# Patient Record
Sex: FEMALE | Race: OTHER | Employment: PART TIME | URBAN - METROPOLITAN AREA
[De-identification: names, ages, dates, MRNs, and addresses within clinical notes are randomized per-mention and may not be internally consistent; named-entity substitution may affect disease eponyms.]

---

## 2020-06-18 PROBLEM — Z82.79 FAMILY HISTORY OF DOWN SYNDROME: Status: ACTIVE | Noted: 2020-06-18

## 2020-06-18 PROBLEM — Z34.90 PREGNANCY: Status: ACTIVE | Noted: 2020-06-18

## 2020-08-03 ENCOUNTER — HOSPITAL ENCOUNTER (EMERGENCY)
Age: 30
Discharge: HOME | End: 2020-08-03
Attending: EMERGENCY MEDICINE
Payer: MEDICAID

## 2020-08-03 VITALS
OXYGEN SATURATION: 100 % | BODY MASS INDEX: 24.48 KG/M2 | DIASTOLIC BLOOD PRESSURE: 85 MMHG | HEIGHT: 62 IN | WEIGHT: 133 LBS | RESPIRATION RATE: 18 BRPM | HEART RATE: 97 BPM | TEMPERATURE: 97.8 F | SYSTOLIC BLOOD PRESSURE: 131 MMHG

## 2020-08-03 DIAGNOSIS — O21.0 HYPEREMESIS GRAVIDARUM: Primary | ICD-10-CM

## 2020-08-03 LAB
ALBUMIN SERPL-MCNC: 3.1 G/DL (ref 3.5–5)
ALBUMIN/GLOB SERPL: 0.7 {RATIO} (ref 1.2–3.5)
ALP SERPL-CCNC: 46 U/L (ref 50–136)
ALT SERPL-CCNC: 26 U/L (ref 12–65)
ANION GAP SERPL CALC-SCNC: 8 MMOL/L (ref 7–16)
AST SERPL-CCNC: 16 U/L (ref 15–37)
BASOPHILS # BLD: 0.1 K/UL (ref 0–0.2)
BASOPHILS NFR BLD: 1 % (ref 0–2)
BILIRUB SERPL-MCNC: 0.4 MG/DL (ref 0.2–1.1)
BUN SERPL-MCNC: 7 MG/DL (ref 6–23)
CALCIUM SERPL-MCNC: 8.7 MG/DL (ref 8.3–10.4)
CHLORIDE SERPL-SCNC: 106 MMOL/L (ref 98–107)
CO2 SERPL-SCNC: 24 MMOL/L (ref 21–32)
CREAT SERPL-MCNC: 0.42 MG/DL (ref 0.6–1)
DIFFERENTIAL METHOD BLD: ABNORMAL
EOSINOPHIL # BLD: 0 K/UL (ref 0–0.8)
EOSINOPHIL NFR BLD: 0 % (ref 0.5–7.8)
ERYTHROCYTE [DISTWIDTH] IN BLOOD BY AUTOMATED COUNT: 12.7 % (ref 11.9–14.6)
GLOBULIN SER CALC-MCNC: 4.3 G/DL (ref 2.3–3.5)
GLUCOSE SERPL-MCNC: 100 MG/DL (ref 65–100)
HCT VFR BLD AUTO: 35.2 % (ref 35.8–46.3)
HGB BLD-MCNC: 12.4 G/DL (ref 11.7–15.4)
IMM GRANULOCYTES # BLD AUTO: 0.1 K/UL (ref 0–0.5)
IMM GRANULOCYTES NFR BLD AUTO: 1 % (ref 0–5)
LYMPHOCYTES # BLD: 2.2 K/UL (ref 0.5–4.6)
LYMPHOCYTES NFR BLD: 27 % (ref 13–44)
MAGNESIUM SERPL-MCNC: 2 MG/DL (ref 1.8–2.4)
MCH RBC QN AUTO: 31.6 PG (ref 26.1–32.9)
MCHC RBC AUTO-ENTMCNC: 35.2 G/DL (ref 31.4–35)
MCV RBC AUTO: 89.8 FL (ref 79.6–97.8)
MONOCYTES # BLD: 0.8 K/UL (ref 0.1–1.3)
MONOCYTES NFR BLD: 10 % (ref 4–12)
NEUTS SEG # BLD: 5.2 K/UL (ref 1.7–8.2)
NEUTS SEG NFR BLD: 62 % (ref 43–78)
NRBC # BLD: 0 K/UL (ref 0–0.2)
PLATELET # BLD AUTO: 233 K/UL (ref 150–450)
PMV BLD AUTO: 11.3 FL (ref 9.4–12.3)
POTASSIUM SERPL-SCNC: 3.1 MMOL/L (ref 3.5–5.1)
PROT SERPL-MCNC: 7.4 G/DL (ref 6.3–8.2)
RBC # BLD AUTO: 3.92 M/UL (ref 4.05–5.2)
SODIUM SERPL-SCNC: 138 MMOL/L (ref 136–145)
WBC # BLD AUTO: 8.3 K/UL (ref 4.3–11.1)

## 2020-08-03 PROCEDURE — 99282 EMERGENCY DEPT VISIT SF MDM: CPT

## 2020-08-03 PROCEDURE — 80053 COMPREHEN METABOLIC PANEL: CPT

## 2020-08-03 PROCEDURE — 83735 ASSAY OF MAGNESIUM: CPT

## 2020-08-03 PROCEDURE — 74011000250 HC RX REV CODE- 250: Performed by: EMERGENCY MEDICINE

## 2020-08-03 PROCEDURE — 96361 HYDRATE IV INFUSION ADD-ON: CPT

## 2020-08-03 PROCEDURE — 96374 THER/PROPH/DIAG INJ IV PUSH: CPT

## 2020-08-03 PROCEDURE — 85025 COMPLETE CBC W/AUTO DIFF WBC: CPT

## 2020-08-03 PROCEDURE — 74011250636 HC RX REV CODE- 250/636: Performed by: EMERGENCY MEDICINE

## 2020-08-03 RX ORDER — SODIUM CHLORIDE 0.9 % (FLUSH) 0.9 %
5-40 SYRINGE (ML) INJECTION EVERY 8 HOURS
Status: DISCONTINUED | OUTPATIENT
Start: 2020-08-03 | End: 2020-08-03 | Stop reason: HOSPADM

## 2020-08-03 RX ORDER — SODIUM CHLORIDE 0.9 % (FLUSH) 0.9 %
5-40 SYRINGE (ML) INJECTION AS NEEDED
Status: DISCONTINUED | OUTPATIENT
Start: 2020-08-03 | End: 2020-08-03 | Stop reason: HOSPADM

## 2020-08-03 RX ADMIN — SODIUM CHLORIDE 12.5 MG: 9 INJECTION INTRAMUSCULAR; INTRAVENOUS; SUBCUTANEOUS at 14:04

## 2020-08-03 RX ADMIN — SODIUM CHLORIDE, SODIUM LACTATE, POTASSIUM CHLORIDE, AND CALCIUM CHLORIDE 1000 ML: 600; 310; 30; 20 INJECTION, SOLUTION INTRAVENOUS at 13:52

## 2020-08-03 NOTE — PROGRESS NOTES
Visited with patient as no PCP listed in chart. Demographics on face sheet verified. Patient states no PCP. I have sent a e-mail to [facility] to aid in finding pt a new PCP. Verified that pt has the capability to have a virtual visit with there new provider via smart phone or computer.

## 2020-08-03 NOTE — ED NOTES
I have reviewed discharge instructions with the patient. The patient verbalized understanding. Patient left ED via Discharge Method: ambulatory to Home with self. Opportunity for questions and clarification provided. Patient given 0 scripts. To continue your aftercare when you leave the hospital, you may receive an automated call from our care team to check in on how you are doing. This is a free service and part of our promise to provide the best care and service to meet your aftercare needs.  If you have questions, or wish to unsubscribe from this service please call [phone]. Thank you for Choosing our [facility] Emergency Department.

## 2020-08-03 NOTE — ED PROVIDER NOTES
Patient was referred to the emergency department by her OB/GYN, Dr. Andrea South for IV hydration secondary to hyperemesis gravidarum. The patient is approximately 16 weeks estimated gestational age of  pregnancy. Patient also had hyperemesis with her first pregnancy. She denies any fever or chills or diarrhea and denies any hematemesis. She also denies any abdominal pain. She has been using Zofran and Phenergan alternately at home with no significant improvement. The history is provided by the patient and medical records. Pregnancy Problem    This is a recurrent problem. The current episode started more than 1 week ago. The problem occurs constantly. The problem has not changed since onset. The patient is experiencing no pain. Associated symptoms include nausea and vomiting. Pertinent negatives include no anorexia, no fever, no belching, no diarrhea, no flatus, no hematochezia, no melena, no constipation, no dysuria, no frequency, no hematuria, no arthralgias, no myalgias, no trauma, no chest pain and no back pain. Nothing worsens the pain. The pain is relieved by nothing. The patient's surgical history non-contributory.        Past Medical History:   Diagnosis Date    Anemia     Miscarriage        Past Surgical History:   Procedure Laterality Date    HX DILATION AND EVACUATION           Family History:   Problem Relation Age of Onset    Hypertension Mother        Social History     Socioeconomic History    Marital status:      Spouse name: Not on file    Number of children: Not on file    Years of education: Not on file    Highest education level: Not on file   Occupational History    Not on file   Social Needs    Financial resource strain: Not on file    Food insecurity     Worry: Not on file     Inability: Not on file    Transportation needs     Medical: Not on file     Non-medical: Not on file   Tobacco Use    Smoking status: Never Smoker    Smokeless tobacco: Never Used   Substance and Sexual Activity    Alcohol use: Not Currently    Drug use: Not Currently    Sexual activity: Yes     Partners: Male     Birth control/protection: None   Lifestyle    Physical activity     Days per week: Not on file     Minutes per session: Not on file    Stress: Not on file   Relationships    Social connections     Talks on phone: Not on file     Gets together: Not on file     Attends [employer] service: Not on file     Active member of club or organization: Not on file     Attends meetings of clubs or organizations: Not on file     Relationship status: Not on file    Intimate partner violence     Fear of current or ex partner: Not on file     Emotionally abused: Not on file     Physically abused: Not on file     Forced sexual activity: Not on file   Other Topics Concern    Not on file   Social History Narrative    Not on file         ALLERGIES: Patient has no known allergies. Review of Systems   Constitutional: Negative for fever. Cardiovascular: Negative for chest pain. Gastrointestinal: Positive for nausea and vomiting. Negative for anorexia, constipation, diarrhea, flatus, hematochezia and melena. Genitourinary: Negative for dysuria, frequency and hematuria. Musculoskeletal: Negative for arthralgias, back pain and myalgias. All other systems reviewed and are negative. Vitals:    08/03/20 1237   BP: 131/85   Pulse: 97   Resp: 18   Temp: 97.8 °F (36.6 °C)   SpO2: 99%   Weight: 60.3 kg (133 lb)   Height: 5' 2\" (1.575 m)            Physical Exam  Vitals signs and nursing note reviewed. Constitutional:       General: She is not in acute distress. Appearance: Normal appearance. She is not ill-appearing, toxic-appearing or diaphoretic. HENT:      Head: Normocephalic and atraumatic. Mouth/Throat:      Mouth: Mucous membranes are moist.      Pharynx: Oropharynx is clear. No oropharyngeal exudate or posterior oropharyngeal erythema.    Eyes:      Extraocular Movements: Extraocular movements intact. Conjunctiva/sclera: Conjunctivae normal.      Pupils: Pupils are equal, round, and reactive to light. Neck:      Musculoskeletal: Normal range of motion and neck supple. Cardiovascular:      Rate and Rhythm: Normal rate and regular rhythm. Pulses: Normal pulses. Heart sounds: Normal heart sounds. Pulmonary:      Effort: Pulmonary effort is normal.      Breath sounds: Normal breath sounds. Abdominal:      General: Bowel sounds are normal. There is no distension. Palpations: Abdomen is soft. Tenderness: There is no abdominal tenderness. There is no right CVA tenderness, left CVA tenderness, guarding or rebound. Musculoskeletal: Normal range of motion. Skin:     General: Skin is warm and dry. Capillary Refill: Capillary refill takes less than 2 seconds. Neurological:      General: No focal deficit present. Mental Status: She is alert and oriented to person, place, and time. Mental status is at baseline. Psychiatric:         Mood and Affect: Mood normal.         Behavior: Behavior normal.         Thought Content:  Thought content normal.          MDM  Number of Diagnoses or Management Options     Amount and/or Complexity of Data Reviewed  Clinical lab tests: ordered and reviewed  Review and summarize past medical records: yes  Independent visualization of images, tracings, or specimens: yes    Risk of Complications, Morbidity, and/or Mortality  Presenting problems: moderate  Diagnostic procedures: moderate  Management options: moderate    Patient Progress  Patient progress: stable         Procedures

## 2020-08-03 NOTE — DISCHARGE INSTRUCTIONS
Patient Education        Extreme Nausea and Vomiting in Pregnancy: Care Instructions  Your Care Instructions     Nausea and vomiting (often called morning sickness) are common in pregnancy. They are caused by pregnancy hormones and happen most often in the first 3 months. Some women get very sick and are not able to keep down food and fluids. This extreme morning sickness is called hyperemesis gravidarum. It can lead to a dangerous loss of fluids in the body. It also can keep you from gaining weight and getting proper nutrition during your pregnancy. Your body fluids are put back in balance with water and minerals called electrolytes. Medicine may help if you have severe nausea and vomiting. Follow-up care is a key part of your treatment and safety. Be sure to make and go to all appointments, and call your doctor if you are having problems. It's also a good idea to know your test results and keep a list of the medicines you take. How can you care for yourself at home? · Take your medicines exactly as prescribed. Call your doctor if you think you are having a problem with your medicine. · Drink plenty of fluids to prevent dehydration. Choose water and other caffeine-free clear liquids until you feel better. Try sipping on sports drinks that have salt and sugar in them. · Eat a small snack, such as crackers, before you get out of bed. Wait a few minutes, then get out of bed slowly. · Keep food in your stomach, but not too much at once. An empty stomach can make nausea worse. Eat several small meals every day instead of three large meals. · Eat more protein and less fat. · Get plenty of vitamin B6 by eating whole grains, nuts, seeds, and legumes. You can take vitamin B6 tablets if your doctor says it is okay. · Try to avoid smells and foods that make you feel sick to your stomach. · Get lots of rest.  · You may want to try acupressure bands.  They put pressure on an acupressure point in the wrist. Some women feel better using the bands. · Martha may also help you feel better. You can use it in tea, take it as a pill, or use a martha syrup that you can buy at a health food store. When should you call for help? [id number] anytime you think you may need emergency care. For example, call if:  · You passed out (lost consciousness). Call your doctor now or seek immediate medical care if:  · You are sick to your stomach or cannot drink fluids. · You have symptoms of dehydration, such as:  ? Dry eyes and a dry mouth. ? Passing only a little urine. ? Feeling thirstier than usual.  · You are not able to keep down your medicines. · You have pain in your belly or pelvis. Watch closely for changes in your health, and be sure to contact your doctor if:  · You do not get better as expected. Where can you learn more? Go to http://mercedes-aaliyah.info/  Enter D203 in the search box to learn more about \"Extreme Nausea and Vomiting in Pregnancy: Care Instructions. \"  Current as of: February 11, 2020               Content Version: 12.5  © 7348-3554 Healthwise, Incorporated. Care instructions adapted under license by [employer] (which disclaims liability or warranty for this information). If you have questions about a medical condition or this instruction, always ask your healthcare professional. [address] any warranty or liability for your use of this information.

## 2020-08-15 ENCOUNTER — HOSPITAL ENCOUNTER (OUTPATIENT)
Age: 30
Discharge: HOME | End: 2020-08-15
Attending: OBSTETRICS & GYNECOLOGY | Admitting: OBSTETRICS & GYNECOLOGY
Payer: MEDICAID

## 2020-08-15 VITALS
HEIGHT: 62 IN | RESPIRATION RATE: 16 BRPM | WEIGHT: 132 LBS | TEMPERATURE: 98.5 F | OXYGEN SATURATION: 98 % | SYSTOLIC BLOOD PRESSURE: 144 MMHG | HEART RATE: 90 BPM | BODY MASS INDEX: 24.29 KG/M2 | DIASTOLIC BLOOD PRESSURE: 83 MMHG

## 2020-08-15 PROBLEM — O21.1 HYPEREMESIS GRAVIDARUM WITH DEHYDRATION: Status: ACTIVE | Noted: 2020-08-15

## 2020-08-15 LAB
ALBUMIN SERPL-MCNC: 2.8 G/DL (ref 3.5–5)
ALBUMIN/GLOB SERPL: 0.7 {RATIO} (ref 1.2–3.5)
ALP SERPL-CCNC: 44 U/L (ref 50–130)
ALT SERPL-CCNC: 19 U/L (ref 12–65)
ANION GAP SERPL CALC-SCNC: 4 MMOL/L (ref 7–16)
AST SERPL-CCNC: 13 U/L (ref 15–37)
BASOPHILS # BLD: 0 K/UL (ref 0–0.2)
BASOPHILS NFR BLD: 0 % (ref 0–2)
BILIRUB SERPL-MCNC: 0.2 MG/DL (ref 0.2–1.1)
BUN SERPL-MCNC: 5 MG/DL (ref 6–23)
CALCIUM SERPL-MCNC: 8.7 MG/DL (ref 8.3–10.4)
CHLORIDE SERPL-SCNC: 105 MMOL/L (ref 98–107)
CO2 SERPL-SCNC: 28 MMOL/L (ref 21–32)
CREAT SERPL-MCNC: 0.49 MG/DL (ref 0.6–1)
DIFFERENTIAL METHOD BLD: ABNORMAL
EOSINOPHIL # BLD: 0 K/UL (ref 0–0.8)
EOSINOPHIL NFR BLD: 0 % (ref 0.5–7.8)
ERYTHROCYTE [DISTWIDTH] IN BLOOD BY AUTOMATED COUNT: 13.1 % (ref 11.9–14.6)
GLOBULIN SER CALC-MCNC: 3.8 G/DL (ref 2.3–3.5)
GLUCOSE SERPL-MCNC: 141 MG/DL (ref 65–100)
GLUCOSE, GLUUPC: NEGATIVE
HCT VFR BLD AUTO: 32.9 % (ref 35.8–46.3)
HGB BLD-MCNC: 11.2 G/DL (ref 11.7–15.4)
IMM GRANULOCYTES # BLD AUTO: 0.1 K/UL (ref 0–0.5)
IMM GRANULOCYTES NFR BLD AUTO: 1 % (ref 0–5)
KETONES UR-MCNC: NORMAL MG/DL
LYMPHOCYTES # BLD: 2.4 K/UL (ref 0.5–4.6)
LYMPHOCYTES NFR BLD: 27 % (ref 13–44)
MAGNESIUM SERPL-MCNC: 1.8 MG/DL (ref 1.8–2.4)
MCH RBC QN AUTO: 31.2 PG (ref 26.1–32.9)
MCHC RBC AUTO-ENTMCNC: 34 G/DL (ref 31.4–35)
MCV RBC AUTO: 91.6 FL (ref 79.6–97.8)
MONOCYTES # BLD: 0.6 K/UL (ref 0.1–1.3)
MONOCYTES NFR BLD: 7 % (ref 4–12)
NEUTS SEG # BLD: 5.6 K/UL (ref 1.7–8.2)
NEUTS SEG NFR BLD: 64 % (ref 43–78)
NRBC # BLD: 0 K/UL (ref 0–0.2)
PLATELET # BLD AUTO: 216 K/UL (ref 150–450)
PMV BLD AUTO: 11.3 FL (ref 9.4–12.3)
POTASSIUM SERPL-SCNC: 3.7 MMOL/L (ref 3.5–5.1)
PROT SERPL-MCNC: 6.6 G/DL (ref 6.3–8.2)
PROT UR QL: NORMAL
RBC # BLD AUTO: 3.59 M/UL (ref 4.05–5.2)
SODIUM SERPL-SCNC: 137 MMOL/L (ref 136–145)
WBC # BLD AUTO: 8.7 K/UL (ref 4.3–11.1)

## 2020-08-15 PROCEDURE — 74011000250 HC RX REV CODE- 250: Performed by: OBSTETRICS & GYNECOLOGY

## 2020-08-15 PROCEDURE — 96360 HYDRATION IV INFUSION INIT: CPT

## 2020-08-15 PROCEDURE — 83735 ASSAY OF MAGNESIUM: CPT

## 2020-08-15 PROCEDURE — 96361 HYDRATE IV INFUSION ADD-ON: CPT

## 2020-08-15 PROCEDURE — 80053 COMPREHEN METABOLIC PANEL: CPT

## 2020-08-15 PROCEDURE — 96374 THER/PROPH/DIAG INJ IV PUSH: CPT

## 2020-08-15 PROCEDURE — 81002 URINALYSIS NONAUTO W/O SCOPE: CPT | Performed by: OBSTETRICS & GYNECOLOGY

## 2020-08-15 PROCEDURE — 76815 OB US LIMITED FETUS(S): CPT

## 2020-08-15 PROCEDURE — 74011250636 HC RX REV CODE- 250/636: Performed by: OBSTETRICS & GYNECOLOGY

## 2020-08-15 PROCEDURE — 75810000275 HC EMERGENCY DEPT VISIT NO LEVEL OF CARE

## 2020-08-15 PROCEDURE — 85025 COMPLETE CBC W/AUTO DIFF WBC: CPT

## 2020-08-15 PROCEDURE — 99284 EMERGENCY DEPT VISIT MOD MDM: CPT

## 2020-08-15 PROCEDURE — 96375 TX/PRO/DX INJ NEW DRUG ADDON: CPT | Performed by: OBSTETRICS & GYNECOLOGY

## 2020-08-15 RX ORDER — SODIUM CHLORIDE, SODIUM LACTATE, POTASSIUM CHLORIDE, CALCIUM CHLORIDE 600; 310; 30; 20 MG/100ML; MG/100ML; MG/100ML; MG/100ML
999 INJECTION, SOLUTION INTRAVENOUS CONTINUOUS
Status: DISPENSED | OUTPATIENT
Start: 2020-08-15 | End: 2020-08-15

## 2020-08-15 RX ORDER — DEXTROSE, SODIUM CHLORIDE, SODIUM LACTATE, POTASSIUM CHLORIDE, AND CALCIUM CHLORIDE 5; .6; .31; .03; .02 G/100ML; G/100ML; G/100ML; G/100ML; G/100ML
999 INJECTION, SOLUTION INTRAVENOUS CONTINUOUS
Status: DISCONTINUED | OUTPATIENT
Start: 2020-08-15 | End: 2020-08-15 | Stop reason: HOSPADM

## 2020-08-15 RX ORDER — DEXTROSE MONOHYDRATE, SODIUM CHLORIDE, SODIUM LACTATE, POTASSIUM CHLORIDE, CALCIUM CHLORIDE 5; 600; 310; 179; 20 G/100ML; MG/100ML; MG/100ML; MG/100ML; MG/100ML
INJECTION, SOLUTION INTRAVENOUS CONTINUOUS
Status: DISCONTINUED | OUTPATIENT
Start: 2020-08-15 | End: 2020-08-15

## 2020-08-15 RX ORDER — FAMOTIDINE 20 MG/1
20 TABLET, FILM COATED ORAL
Qty: 90 TAB | Refills: 1 | Status: SHIPPED | OUTPATIENT
Start: 2020-08-15 | End: 2020-10-26

## 2020-08-15 RX ORDER — DOXYLAMINE SUCCINATE AND PYRIDOXINE HYDROCHLORIDE, DELAYED RELEASE TABLETS 10 MG/10 MG 10; 10 MG/1; MG/1
2 TABLET, DELAYED RELEASE ORAL
Qty: 60 TAB | Refills: 2 | Status: SHIPPED | OUTPATIENT
Start: 2020-08-15 | End: 2020-10-26

## 2020-08-15 RX ADMIN — SODIUM CHLORIDE 12.5 MG: 9 INJECTION INTRAMUSCULAR; INTRAVENOUS; SUBCUTANEOUS at 17:46

## 2020-08-15 RX ADMIN — SODIUM CHLORIDE, SODIUM LACTATE, POTASSIUM CHLORIDE, AND CALCIUM CHLORIDE 999 ML/HR: 600; 310; 30; 20 INJECTION, SOLUTION INTRAVENOUS at 17:46

## 2020-08-15 RX ADMIN — SODIUM CHLORIDE, SODIUM LACTATE, POTASSIUM CHLORIDE, AND CALCIUM CHLORIDE 999 ML/HR: 600; 310; 30; 20 INJECTION, SOLUTION INTRAVENOUS at 18:44

## 2020-08-15 NOTE — H&P
Chief Complaint: hyperemesis      31 y.o. female  at 17w5d  weeks gestation who is seen for hyperemesis. Pt has had vomiting throughout this pregnancy with some days worse than others. She had similar symptoms with last pregnancy. She reports that has thrown up around 15 times today. She has zofran, but that causes nausea. No abd pain. No vag bleeding or discharge        HISTORY:    Social History     Substance and Sexual Activity   Sexual Activity Yes    Partners: Male    Birth control/protection: None     Patient's last menstrual period was 2020.     Social History     Socioeconomic History    Marital status:      Spouse name: Not on file    Number of children: Not on file    Years of education: Not on file    Highest education level: Not on file   Occupational History    Not on file   Social Needs    Financial resource strain: Not on file    Food insecurity     Worry: Not on file     Inability: Not on file    Transportation needs     Medical: Not on file     Non-medical: Not on file   Tobacco Use    Smoking status: Never Smoker    Smokeless tobacco: Never Used   Substance and Sexual Activity    Alcohol use: Not Currently    Drug use: Not Currently    Sexual activity: Yes     Partners: Male     Birth control/protection: None   Lifestyle    Physical activity     Days per week: Not on file     Minutes per session: Not on file    Stress: Not on file   Relationships    Social connections     Talks on phone: Not on file     Gets together: Not on file     Attends Yarsani service: Not on file     Active member of club or organization: Not on file     Attends meetings of clubs or organizations: Not on file     Relationship status: Not on file    Intimate partner violence     Fear of current or ex partner: Not on file     Emotionally abused: Not on file     Physically abused: Not on file     Forced sexual activity: Not on file   Other Topics Concern   [address] Service Not Asked    Blood Transfusions Not Asked    Caffeine Concern Not Asked    Occupational Exposure Not Asked    Hobby Hazards Not Asked    Sleep Concern Not Asked    Stress Concern Not Asked    Weight Concern Not Asked    Special Diet Not Asked    Back Care Not Asked    Exercise Not Asked    Bike Helmet Not Asked   [address] Not Asked    Self-Exams Not Asked   Social History Narrative    Not on file       Past Surgical History:   Procedure Laterality Date    HX DILATION AND EVACUATION         Past Medical History:   Diagnosis Date    Anemia     Miscarriage          ROS:  A 12 point review of symptoms negative except for chief complaint as described above. PHYSICAL EXAM:  Blood pressure 144/83, pulse 90, temperature 98.5 °F (36.9 °C), resp. rate 16, height 5' 2\" (1.575 m), weight 59.9 kg (132 lb), last menstrual period 04/08/2020, SpO2 98 %. Constitutional: The patient appears well, alert, oriented x 3. Cardiovascular: Heart RRR, no murmurs.    Respiratory: Lungs clear, no respiratory distress  GI: Abdomen soft, nontender, no guarding  No fundal tenderness  Musculoskeletal: no cva tenderness  Upper ext: no edema, reflexes +2  Lower ext: no edema, neg soy's, reflexes +2  Skin: no rashes or lesions  Psychiatric:Mood/ Affect: appropriate  Genitourinary: SVE:deferred  FHT:+  Brief bedside ultrasound - lots of fetal movement, ant placenta grade 1, subjectively normal heather  Recent Results (from the past 12 hour(s))   POC URINE DIPSTICK MANUAL    Collection Time: 08/15/20  5:36 PM   Result Value Ref Range    Protein (POC) Trace Negative    Glucose, urine (POC) Negative Negative    Ketones (POC) 15 mg/dL Negative   CBC WITH AUTOMATED DIFF    Collection Time: 08/15/20  5:42 PM   Result Value Ref Range    WBC 8.7 4.3 - 11.1 K/uL    RBC 3.59 (L) 4.05 - 5.2 M/uL    HGB 11.2 (L) 11.7 - 15.4 g/dL    HCT 32.9 (L) 35.8 - 46.3 %    MCV 91.6 79.6 - 97.8 FL    MCH 31.2 26.1 - 32.9 PG    MCHC 34.0 31.4 - 35.0 g/dL    RDW 13.1 11.9 - 14.6 %    PLATELET 318 464 - 470 K/uL    MPV 11.3 9.4 - 12.3 FL    ABSOLUTE NRBC 0.00 0.0 - 0.2 K/uL    DF AUTOMATED      NEUTROPHILS 64 43 - 78 %    LYMPHOCYTES 27 13 - 44 %    MONOCYTES 7 4.0 - 12.0 %    EOSINOPHILS 0 (L) 0.5 - 7.8 %    BASOPHILS 0 0.0 - 2.0 %    IMMATURE GRANULOCYTES 1 0.0 - 5.0 %    ABS. NEUTROPHILS 5.6 1.7 - 8.2 K/UL    ABS. LYMPHOCYTES 2.4 0.5 - 4.6 K/UL    ABS. MONOCYTES 0.6 0.1 - 1.3 K/UL    ABS. EOSINOPHILS 0.0 0.0 - 0.8 K/UL    ABS. BASOPHILS 0.0 0.0 - 0.2 K/UL    ABS. IMM. GRANS. 0.1 0.0 - 0.5 K/UL   METABOLIC PANEL, COMPREHENSIVE    Collection Time: 08/15/20  5:42 PM   Result Value Ref Range    Sodium 137 136 - 145 mmol/L    Potassium 3.7 3.5 - 5.1 mmol/L    Chloride 105 98 - 107 mmol/L    CO2 28 21 - 32 mmol/L    Anion gap 4 (L) 7 - 16 mmol/L    Glucose 141 (H) 65 - 100 mg/dL    BUN 5 (L) 6 - 23 MG/DL    Creatinine 0.49 (L) 0.6 - 1.0 MG/DL    GFR est AA >60 >60 ml/min/1.73m2    GFR est non-AA >60 >60 ml/min/1.73m2    Calcium 8.7 8.3 - 10.4 MG/DL    Bilirubin, total 0.2 0.2 - 1.1 MG/DL    ALT (SGPT) 19 12 - 65 U/L    AST (SGOT) 13 (L) 15 - 37 U/L    Alk.  phosphatase 44 (L) 50 - 130 U/L    Protein, total 6.6 6.3 - 8.2 g/dL    Albumin 2.8 (L) 3.5 - 5.0 g/dL    Globulin 3.8 (H) 2.3 - 3.5 g/dL    A-G Ratio 0.7 (L) 1.2 - 3.5     MAGNESIUM    Collection Time: 08/15/20  5:42 PM   Result Value Ref Range    Magnesium 1.8 1.8 - 2.4 mg/dL       I personally reviewed pt's medical record including relevant labs and ultrasounds  I reviewed the NST at today's encounter    Assessment/Plan:  30 yo  at 17w5d with hyperemesis  Pt with dehydration based on urine ketones  Will hydrate with iv and treat with iv phenergan  rx for diclegis with instructions to pharmacist to help with otc meds if diclegis needs prior auth  Instructions for pepcid and bland diet  Reassuring fetal status  Keep f/u as scheduled or sooner if vomiting persists

## 2020-08-15 NOTE — ED TRIAGE NOTES
Patient advises 17 weeks 5 days pregnant with 3rd pregnancy and 1 live birth. Patient advises off and on abdominal cramping 2 days ago. Patient was here and diagnosed with hyperemesis gravidarum on 8/3/2020. States she has vomited about 15 times today. Patient advises headache since 8 weeks. Denies any vaginal bleeding or discharge. Mask on during triage. Report called to L&D triage.

## 2020-08-15 NOTE — PROGRESS NOTES
Work/School Note    Date: 8/15/2020    To Whom It May concern:      Mariya Broussard was seen and treated today in the emergency room by the following provider(s):  Attending Provider: Yoselyn Go MD.    Kinza Ross MD  Mariya Broussard is excused from work/school on 8/16/20    She is clear to return to work/school on 08/17/20        Sincerely,          Theo Veronica MD

## 2020-08-15 NOTE — PROGRESS NOTES
1625 Pt. Arrived to [facility] for nausea and vomitting. 1735 22 G [facility]  1848 I have reviewed discharge instructions with the patient. The patient verbalized understanding. Rx given to pt.

## 2020-08-15 NOTE — DISCHARGE INSTRUCTIONS
Patient Education        Extreme Nausea and Vomiting in Pregnancy: Care Instructions  Your Care Instructions     Nausea and vomiting (often called morning sickness) are common in pregnancy. They are caused by pregnancy hormones and happen most often in the first 3 months. Some women get very sick and are not able to keep down food and fluids. This extreme morning sickness is called hyperemesis gravidarum. It can lead to a dangerous loss of fluids in the body. It also can keep you from gaining weight and getting proper nutrition during your pregnancy. Your body fluids are put back in balance with water and minerals called electrolytes. Medicine may help if you have severe nausea and vomiting. Follow-up care is a key part of your treatment and safety. Be sure to make and go to all appointments, and call your doctor if you are having problems. It's also a good idea to know your test results and keep a list of the medicines you take. How can you care for yourself at home? · Take your medicines exactly as prescribed. Call your doctor if you think you are having a problem with your medicine. · Drink plenty of fluids to prevent dehydration. Choose water and other caffeine-free clear liquids until you feel better. Try sipping on sports drinks that have salt and sugar in them. · Eat a small snack, such as crackers, before you get out of bed. Wait a few minutes, then get out of bed slowly. · Keep food in your stomach, but not too much at once. An empty stomach can make nausea worse. Eat several small meals every day instead of three large meals. · Eat more protein and less fat. · Get plenty of vitamin B6 by eating whole grains, nuts, seeds, and legumes. You can take vitamin B6 tablets if your doctor says it is okay. · Try to avoid smells and foods that make you feel sick to your stomach. · Get lots of rest.  · You may want to try acupressure bands.  They put pressure on an acupressure point in the wrist. Some women feel better using the bands. · Martha may also help you feel better. You can use it in tea, take it as a pill, or use a martha syrup that you can buy at a health food store. When should you call for help? [id number] anytime you think you may need emergency care. For example, call if:  · You passed out (lost consciousness). Call your doctor now or seek immediate medical care if:  · You are sick to your stomach or cannot drink fluids. · You have symptoms of dehydration, such as:  ? Dry eyes and a dry mouth. ? Passing only a little urine. ? Feeling thirstier than usual.  · You are not able to keep down your medicines. · You have pain in your belly or pelvis. Watch closely for changes in your health, and be sure to contact your doctor if:  · You do not get better as expected. Where can you learn more? Go to http://www.gray.com/  Enter A135 in the search box to learn more about \"Extreme Nausea and Vomiting in Pregnancy: Care Instructions. \"  Current as of: February 11, 2020               Content Version: 12.5  © 2003-7496 Healthwise, Incorporated. Care instructions adapted under license by [employer] (which disclaims liability or warranty for this information). If you have questions about a medical condition or this instruction, always ask your healthcare professional. [address] any warranty or liability for your use of this information.

## 2020-10-01 PROBLEM — O21.1 HYPEREMESIS GRAVIDARUM WITH DEHYDRATION: Status: RESOLVED | Noted: 2020-08-15 | Resolved: 2020-10-01

## 2020-12-16 PROBLEM — R19.7 DIARRHEA DURING PREGNANCY: Status: ACTIVE | Noted: 2020-12-16

## 2020-12-16 PROBLEM — O26.899 DIARRHEA DURING PREGNANCY: Status: ACTIVE | Noted: 2020-12-16

## 2020-12-29 ENCOUNTER — ANESTHESIA (OUTPATIENT)
Dept: LABOR AND DELIVERY | Age: 30
DRG: 560 | End: 2020-12-29
Payer: MEDICAID

## 2020-12-29 ENCOUNTER — ANESTHESIA EVENT (OUTPATIENT)
Dept: LABOR AND DELIVERY | Age: 30
DRG: 560 | End: 2020-12-29
Payer: MEDICAID

## 2020-12-29 ENCOUNTER — HOSPITAL ENCOUNTER (INPATIENT)
Age: 30
LOS: 2 days | Discharge: HOME | DRG: 560 | End: 2020-12-31
Attending: OBSTETRICS & GYNECOLOGY | Admitting: OBSTETRICS & GYNECOLOGY
Payer: MEDICAID

## 2020-12-29 PROBLEM — O13.3 GESTATIONAL HYPERTENSION, THIRD TRIMESTER: Status: ACTIVE | Noted: 2020-12-29

## 2020-12-29 PROBLEM — Z37.9 NORMAL LABOR: Status: ACTIVE | Noted: 2020-12-29

## 2020-12-29 LAB
ALBUMIN SERPL-MCNC: 2.3 G/DL (ref 3.5–5)
ALBUMIN/GLOB SERPL: 0.5 {RATIO} (ref 1.2–3.5)
ALP SERPL-CCNC: 184 U/L (ref 50–136)
ALT SERPL-CCNC: 23 U/L (ref 12–65)
ANION GAP SERPL CALC-SCNC: 8 MMOL/L (ref 7–16)
ARTERIAL PATENCY WRIST A: ABNORMAL
AST SERPL-CCNC: 18 U/L (ref 15–37)
BACTERIA SPEC CULT: NORMAL
BASE DEFICIT BLD-SCNC: 5 MMOL/L
BDY SITE: ABNORMAL
BILIRUB SERPL-MCNC: 0.2 MG/DL (ref 0.2–1.1)
BUN SERPL-MCNC: 8 MG/DL (ref 6–23)
CALCIUM SERPL-MCNC: 8.6 MG/DL (ref 8.3–10.4)
CHLORIDE SERPL-SCNC: 109 MMOL/L (ref 98–107)
CO2 BLD-SCNC: 22 MMOL/L
CO2 SERPL-SCNC: 22 MMOL/L (ref 21–32)
COLLECT TIME,HTIME: 1931
CREAT SERPL-MCNC: 0.41 MG/DL (ref 0.6–1)
ERYTHROCYTE [DISTWIDTH] IN BLOOD BY AUTOMATED COUNT: 17.2 % (ref 11.9–14.6)
GAS FLOW.O2 O2 DELIVERY SYS: ABNORMAL L/MIN
GLOBULIN SER CALC-MCNC: 4.2 G/DL (ref 2.3–3.5)
GLUCOSE SERPL-MCNC: 75 MG/DL (ref 65–100)
HCO3 BLDV-SCNC: 20.7 MMOL/L (ref 23–28)
HCT VFR BLD AUTO: 27.2 % (ref 35.8–46.3)
HCT VFR BLD AUTO: 27.4 % (ref 35.8–46.3)
HGB BLD-MCNC: 8.2 G/DL (ref 11.7–15.4)
HGB BLD-MCNC: 8.3 G/DL (ref 11.7–15.4)
HISTORY CHECKED?,CKHIST: NORMAL
MAGNESIUM SERPL-MCNC: 4.6 MG/DL (ref 1.8–2.4)
MCH RBC QN AUTO: 24.8 PG (ref 26.1–32.9)
MCHC RBC AUTO-ENTMCNC: 30.5 G/DL (ref 31.4–35)
MCV RBC AUTO: 81.2 FL (ref 79.6–97.8)
NRBC # BLD: 0.02 K/UL (ref 0–0.2)
PCO2 BLDCO: 41 MMHG (ref 32–68)
PH BLDCO: 7.31 [PH] (ref 7.15–7.38)
PLATELET # BLD AUTO: 222 K/UL (ref 150–450)
PMV BLD AUTO: 11.7 FL (ref 9.4–12.3)
PO2 BLDCO: 29 MMHG
POTASSIUM SERPL-SCNC: 3.8 MMOL/L (ref 3.5–5.1)
PROT SERPL-MCNC: 6.5 G/DL (ref 6.3–8.2)
RBC # BLD AUTO: 3.35 M/UL (ref 4.05–5.2)
SAO2 % BLDV: 48 % (ref 65–88)
SERVICE CMNT-IMP: ABNORMAL
SERVICE CMNT-IMP: NORMAL
SODIUM SERPL-SCNC: 139 MMOL/L (ref 136–145)
SPECIMEN TYPE: ABNORMAL
WBC # BLD AUTO: 6.9 K/UL (ref 4.3–11.1)

## 2020-12-29 PROCEDURE — A4300 CATH IMPL VASC ACCESS PORTAL: HCPCS | Performed by: ANESTHESIOLOGY

## 2020-12-29 PROCEDURE — 36415 COLL VENOUS BLD VENIPUNCTURE: CPT

## 2020-12-29 PROCEDURE — 74011000250 HC RX REV CODE- 250: Performed by: OBSTETRICS & GYNECOLOGY

## 2020-12-29 PROCEDURE — 74011250636 HC RX REV CODE- 250/636: Performed by: ANESTHESIOLOGY

## 2020-12-29 PROCEDURE — 74011250636 HC RX REV CODE- 250/636: Performed by: NURSE ANESTHETIST, CERTIFIED REGISTERED

## 2020-12-29 PROCEDURE — 2709999900 HC NON-CHARGEABLE SUPPLY

## 2020-12-29 PROCEDURE — 10907ZC DRAINAGE OF AMNIOTIC FLUID, THERAPEUTIC FROM PRODUCTS OF CONCEPTION, VIA NATURAL OR ARTIFICIAL OPENING: ICD-10-PCS | Performed by: OBSTETRICS & GYNECOLOGY

## 2020-12-29 PROCEDURE — 75410000002 HC LABOR FEE PER 1 HR: Performed by: OBSTETRICS & GYNECOLOGY

## 2020-12-29 PROCEDURE — 76060000078 HC EPIDURAL ANESTHESIA: Performed by: OBSTETRICS & GYNECOLOGY

## 2020-12-29 PROCEDURE — 74011250637 HC RX REV CODE- 250/637: Performed by: OBSTETRICS & GYNECOLOGY

## 2020-12-29 PROCEDURE — 83735 ASSAY OF MAGNESIUM: CPT

## 2020-12-29 PROCEDURE — 80053 COMPREHEN METABOLIC PANEL: CPT

## 2020-12-29 PROCEDURE — 0KQM0ZZ REPAIR PERINEUM MUSCLE, OPEN APPROACH: ICD-10-PCS | Performed by: OBSTETRICS & GYNECOLOGY

## 2020-12-29 PROCEDURE — 87653 STREP B DNA AMP PROBE: CPT

## 2020-12-29 PROCEDURE — 86901 BLOOD TYPING SEROLOGIC RH(D): CPT

## 2020-12-29 PROCEDURE — 00HU33Z INSERTION OF INFUSION DEVICE INTO SPINAL CANAL, PERCUTANEOUS APPROACH: ICD-10-PCS | Performed by: ANESTHESIOLOGY

## 2020-12-29 PROCEDURE — 74011000250 HC RX REV CODE- 250: Performed by: ANESTHESIOLOGY

## 2020-12-29 PROCEDURE — 75410000000 HC DELIVERY VAGINAL/SINGLE: Performed by: OBSTETRICS & GYNECOLOGY

## 2020-12-29 PROCEDURE — 75410000003 HC RECOV DEL/VAG/CSECN EA 0.5 HR: Performed by: OBSTETRICS & GYNECOLOGY

## 2020-12-29 PROCEDURE — 74011250636 HC RX REV CODE- 250/636: Performed by: OBSTETRICS & GYNECOLOGY

## 2020-12-29 PROCEDURE — 77030014125 HC TY EPDRL BBMI -B: Performed by: ANESTHESIOLOGY

## 2020-12-29 PROCEDURE — 86923 COMPATIBILITY TEST ELECTRIC: CPT

## 2020-12-29 PROCEDURE — 85027 COMPLETE CBC AUTOMATED: CPT

## 2020-12-29 PROCEDURE — 74011000258 HC RX REV CODE- 258: Performed by: OBSTETRICS & GYNECOLOGY

## 2020-12-29 PROCEDURE — 85018 HEMOGLOBIN: CPT

## 2020-12-29 PROCEDURE — 65270000029 HC RM PRIVATE

## 2020-12-29 PROCEDURE — 82803 BLOOD GASES ANY COMBINATION: CPT

## 2020-12-29 PROCEDURE — 87081 CULTURE SCREEN ONLY: CPT

## 2020-12-29 RX ORDER — LOPERAMIDE HYDROCHLORIDE 2 MG/1
4 CAPSULE ORAL
Status: DISCONTINUED | OUTPATIENT
Start: 2020-12-29 | End: 2020-12-31 | Stop reason: HOSPADM

## 2020-12-29 RX ORDER — ROPIVACAINE HYDROCHLORIDE 5 MG/ML
INJECTION, SOLUTION EPIDURAL; INFILTRATION; PERINEURAL AS NEEDED
Status: DISCONTINUED | OUTPATIENT
Start: 2020-12-29 | End: 2020-12-29 | Stop reason: HOSPADM

## 2020-12-29 RX ORDER — DEXTROSE, SODIUM CHLORIDE, SODIUM LACTATE, POTASSIUM CHLORIDE, AND CALCIUM CHLORIDE 5; .6; .31; .03; .02 G/100ML; G/100ML; G/100ML; G/100ML; G/100ML
125 INJECTION, SOLUTION INTRAVENOUS CONTINUOUS
Status: DISCONTINUED | OUTPATIENT
Start: 2020-12-29 | End: 2020-12-30

## 2020-12-29 RX ORDER — FENTANYL CITRATE 50 UG/ML
INJECTION, SOLUTION INTRAMUSCULAR; INTRAVENOUS AS NEEDED
Status: DISCONTINUED | OUTPATIENT
Start: 2020-12-29 | End: 2020-12-29 | Stop reason: HOSPADM

## 2020-12-29 RX ORDER — IBUPROFEN 800 MG/1
800 TABLET ORAL
Status: DISCONTINUED | OUTPATIENT
Start: 2020-12-29 | End: 2020-12-31 | Stop reason: HOSPADM

## 2020-12-29 RX ORDER — LIDOCAINE HYDROCHLORIDE 10 MG/ML
1 INJECTION INFILTRATION; PERINEURAL
Status: DISCONTINUED | OUTPATIENT
Start: 2020-12-29 | End: 2020-12-30 | Stop reason: HOSPADM

## 2020-12-29 RX ORDER — SODIUM CHLORIDE 0.9 % (FLUSH) 0.9 %
5-40 SYRINGE (ML) INJECTION AS NEEDED
Status: DISCONTINUED | OUTPATIENT
Start: 2020-12-29 | End: 2020-12-30 | Stop reason: HOSPADM

## 2020-12-29 RX ORDER — FAMOTIDINE 20 MG/1
20 TABLET, FILM COATED ORAL ONCE
Status: DISCONTINUED | OUTPATIENT
Start: 2020-12-29 | End: 2020-12-29 | Stop reason: SDUPTHER

## 2020-12-29 RX ORDER — HYDRALAZINE HYDROCHLORIDE 20 MG/ML
10 INJECTION INTRAMUSCULAR; INTRAVENOUS
Status: ACTIVE | OUTPATIENT
Start: 2020-12-29 | End: 2020-12-30

## 2020-12-29 RX ORDER — SODIUM CHLORIDE 0.9 % (FLUSH) 0.9 %
5-40 SYRINGE (ML) INJECTION EVERY 8 HOURS
Status: DISCONTINUED | OUTPATIENT
Start: 2020-12-29 | End: 2020-12-30 | Stop reason: HOSPADM

## 2020-12-29 RX ORDER — PROMETHAZINE HYDROCHLORIDE 25 MG/1
25 TABLET ORAL
Status: DISCONTINUED | OUTPATIENT
Start: 2020-12-29 | End: 2020-12-31 | Stop reason: HOSPADM

## 2020-12-29 RX ORDER — FAMOTIDINE 20 MG/1
20 TABLET, FILM COATED ORAL ONCE
Status: ACTIVE | OUTPATIENT
Start: 2020-12-29 | End: 2020-12-30

## 2020-12-29 RX ORDER — MINERAL OIL
120 OIL (ML) ORAL
Status: COMPLETED | OUTPATIENT
Start: 2020-12-29 | End: 2020-12-29

## 2020-12-29 RX ORDER — LABETALOL HYDROCHLORIDE 5 MG/ML
80 INJECTION, SOLUTION INTRAVENOUS
Status: ACTIVE | OUTPATIENT
Start: 2020-12-29 | End: 2020-12-30

## 2020-12-29 RX ORDER — OXYTOCIN/RINGER'S LACTATE 30/500 ML
87.3 PLASTIC BAG, INJECTION (ML) INTRAVENOUS AS NEEDED
Status: COMPLETED | OUTPATIENT
Start: 2020-12-29 | End: 2020-12-29

## 2020-12-29 RX ORDER — ROPIVACAINE HYDROCHLORIDE 2 MG/ML
INJECTION, SOLUTION EPIDURAL; INFILTRATION; PERINEURAL AS NEEDED
Status: DISCONTINUED | OUTPATIENT
Start: 2020-12-29 | End: 2020-12-29 | Stop reason: HOSPADM

## 2020-12-29 RX ORDER — LIDOCAINE HYDROCHLORIDE 20 MG/ML
JELLY TOPICAL
Status: DISCONTINUED | OUTPATIENT
Start: 2020-12-29 | End: 2020-12-30 | Stop reason: HOSPADM

## 2020-12-29 RX ORDER — ROPIVACAINE HYDROCHLORIDE 2 MG/ML
INJECTION, SOLUTION EPIDURAL; INFILTRATION; PERINEURAL
Status: DISCONTINUED | OUTPATIENT
Start: 2020-12-29 | End: 2020-12-29 | Stop reason: HOSPADM

## 2020-12-29 RX ORDER — HYDROCODONE BITARTRATE AND ACETAMINOPHEN 5; 325 MG/1; MG/1
1 TABLET ORAL
Status: DISCONTINUED | OUTPATIENT
Start: 2020-12-29 | End: 2020-12-31 | Stop reason: HOSPADM

## 2020-12-29 RX ORDER — OXYTOCIN/RINGER'S LACTATE 30/500 ML
125 PLASTIC BAG, INJECTION (ML) INTRAVENOUS ONCE
Status: COMPLETED | OUTPATIENT
Start: 2020-12-29 | End: 2020-12-29

## 2020-12-29 RX ORDER — OXYTOCIN/RINGER'S LACTATE 30/500 ML
10 PLASTIC BAG, INJECTION (ML) INTRAVENOUS AS NEEDED
Status: COMPLETED | OUTPATIENT
Start: 2020-12-29 | End: 2020-12-29

## 2020-12-29 RX ORDER — NALOXONE HYDROCHLORIDE 0.4 MG/ML
0.4 INJECTION, SOLUTION INTRAMUSCULAR; INTRAVENOUS; SUBCUTANEOUS AS NEEDED
Status: DISCONTINUED | OUTPATIENT
Start: 2020-12-29 | End: 2020-12-31 | Stop reason: HOSPADM

## 2020-12-29 RX ORDER — SODIUM CHLORIDE 9 MG/ML
250 INJECTION, SOLUTION INTRAVENOUS AS NEEDED
Status: DISCONTINUED | OUTPATIENT
Start: 2020-12-29 | End: 2020-12-31 | Stop reason: HOSPADM

## 2020-12-29 RX ORDER — LABETALOL HYDROCHLORIDE 5 MG/ML
20 INJECTION, SOLUTION INTRAVENOUS
Status: COMPLETED | OUTPATIENT
Start: 2020-12-29 | End: 2020-12-29

## 2020-12-29 RX ORDER — ONDANSETRON 2 MG/ML
4 INJECTION INTRAMUSCULAR; INTRAVENOUS ONCE
Status: COMPLETED | OUTPATIENT
Start: 2020-12-29 | End: 2020-12-29

## 2020-12-29 RX ORDER — SIMETHICONE 80 MG
80 TABLET,CHEWABLE ORAL
Status: DISCONTINUED | OUTPATIENT
Start: 2020-12-29 | End: 2020-12-31 | Stop reason: HOSPADM

## 2020-12-29 RX ORDER — OXYTOCIN/RINGER'S LACTATE 30/500 ML
0-20 PLASTIC BAG, INJECTION (ML) INTRAVENOUS
Status: DISCONTINUED | OUTPATIENT
Start: 2020-12-29 | End: 2020-12-30

## 2020-12-29 RX ORDER — DIPHENHYDRAMINE HCL 25 MG
25 CAPSULE ORAL
Status: DISCONTINUED | OUTPATIENT
Start: 2020-12-29 | End: 2020-12-31 | Stop reason: HOSPADM

## 2020-12-29 RX ORDER — BUTORPHANOL TARTRATE 2 MG/ML
1 INJECTION INTRAMUSCULAR; INTRAVENOUS
Status: DISCONTINUED | OUTPATIENT
Start: 2020-12-29 | End: 2020-12-30 | Stop reason: HOSPADM

## 2020-12-29 RX ORDER — LABETALOL HYDROCHLORIDE 5 MG/ML
40 INJECTION, SOLUTION INTRAVENOUS
Status: ACTIVE | OUTPATIENT
Start: 2020-12-29 | End: 2020-12-30

## 2020-12-29 RX ORDER — ZOLPIDEM TARTRATE 5 MG/1
5 TABLET ORAL
Status: DISCONTINUED | OUTPATIENT
Start: 2020-12-29 | End: 2020-12-31 | Stop reason: HOSPADM

## 2020-12-29 RX ORDER — LIDOCAINE HYDROCHLORIDE AND EPINEPHRINE 15; 5 MG/ML; UG/ML
INJECTION, SOLUTION EPIDURAL
Status: COMPLETED | OUTPATIENT
Start: 2020-12-29 | End: 2020-12-29

## 2020-12-29 RX ORDER — DOCUSATE SODIUM 100 MG/1
100 CAPSULE, LIQUID FILLED ORAL 2 TIMES DAILY
Status: DISCONTINUED | OUTPATIENT
Start: 2020-12-29 | End: 2020-12-31 | Stop reason: HOSPADM

## 2020-12-29 RX ORDER — MAGNESIUM SULFATE HEPTAHYDRATE 40 MG/ML
4 INJECTION, SOLUTION INTRAVENOUS ONCE
Status: COMPLETED | OUTPATIENT
Start: 2020-12-29 | End: 2020-12-29

## 2020-12-29 RX ORDER — MAGNESIUM SULFATE HEPTAHYDRATE 40 MG/ML
2 INJECTION, SOLUTION INTRAVENOUS CONTINUOUS
Status: DISCONTINUED | OUTPATIENT
Start: 2020-12-29 | End: 2020-12-30

## 2020-12-29 RX ADMIN — IBUPROFEN 800 MG: 800 TABLET ORAL at 23:58

## 2020-12-29 RX ADMIN — LABETALOL HYDROCHLORIDE 20 MG: 5 INJECTION INTRAVENOUS at 12:31

## 2020-12-29 RX ADMIN — SODIUM CHLORIDE 5 MILLION UNITS: 900 INJECTION INTRAVENOUS at 11:10

## 2020-12-29 RX ADMIN — HYDROCODONE BITARTRATE AND ACETAMINOPHEN 1 TABLET: 5; 325 TABLET ORAL at 21:47

## 2020-12-29 RX ADMIN — ROPIVACAINE HYDROCHLORIDE 8 ML/HR: 2 INJECTION, SOLUTION EPIDURAL; INFILTRATION at 13:49

## 2020-12-29 RX ADMIN — Medication 4 ML: at 13:48

## 2020-12-29 RX ADMIN — FENTANYL CITRATE 100 MCG: 50 INJECTION INTRAMUSCULAR; INTRAVENOUS at 18:40

## 2020-12-29 RX ADMIN — Medication 10000 MILLI-UNITS: at 19:33

## 2020-12-29 RX ADMIN — Medication 2 MILLI-UNITS/MIN: at 11:58

## 2020-12-29 RX ADMIN — ROPIVACAINE HYDROCHLORIDE 8 ML: 5 INJECTION, SOLUTION EPIDURAL; INFILTRATION; PERINEURAL at 18:40

## 2020-12-29 RX ADMIN — LOPERAMIDE HYDROCHLORIDE 4 MG: 2 CAPSULE ORAL at 11:19

## 2020-12-29 RX ADMIN — Medication 4 ML: at 13:46

## 2020-12-29 RX ADMIN — MINERAL OIL 120 ML: 1000 LIQUID ORAL at 19:17

## 2020-12-29 RX ADMIN — Medication 7500 MILLI-UNITS/HR: at 20:39

## 2020-12-29 RX ADMIN — SODIUM CHLORIDE, SODIUM LACTATE, POTASSIUM CHLORIDE, CALCIUM CHLORIDE, AND DEXTROSE MONOHYDRATE 125 ML/HR: 600; 310; 30; 20; 5 INJECTION, SOLUTION INTRAVENOUS at 10:56

## 2020-12-29 RX ADMIN — MAGNESIUM SULFATE HEPTAHYDRATE 4 G: 40 INJECTION, SOLUTION INTRAVENOUS at 14:27

## 2020-12-29 RX ADMIN — LIDOCAINE HYDROCHLORIDE,EPINEPHRINE BITARTRATE 3.5 ML: 15; .005 INJECTION, SOLUTION EPIDURAL; INFILTRATION; INTRACAUDAL; PERINEURAL at 13:39

## 2020-12-29 RX ADMIN — MAGNESIUM SULFATE HEPTAHYDRATE 2 G/HR: 40 INJECTION, SOLUTION INTRAVENOUS at 14:53

## 2020-12-29 RX ADMIN — Medication 500 MILLI-UNITS/MIN: at 20:00

## 2020-12-29 RX ADMIN — SODIUM CHLORIDE 2.5 MILLION UNITS: 9 INJECTION, SOLUTION INTRAVENOUS at 14:54

## 2020-12-29 RX ADMIN — ONDANSETRON 4 MG: 2 INJECTION INTRAMUSCULAR; INTRAVENOUS at 20:29

## 2020-12-29 NOTE — ANESTHESIA PREPROCEDURE EVALUATION
Relevant Problems   No relevant active problems       Anesthetic History   No history of anesthetic complications            Review of Systems / Medical History  Patient summary reviewed and pertinent labs reviewed    Pulmonary  Within defined limits                 Neuro/Psych   Within defined limits           Cardiovascular                  Exercise tolerance: >4 METS  Comments: Admitted with elevated BP and edema  Also diarrhea for last week   GI/Hepatic/Renal  Within defined limits              Endo/Other  Within defined limits           Other Findings              Physical Exam    Airway  Mallampati: II  TM Distance: 4 - 6 cm  Neck ROM: normal range of motion   Mouth opening: Normal     Cardiovascular    Rhythm: regular  Rate: normal         Dental  No notable dental hx       Pulmonary  Breath sounds clear to auscultation               Abdominal         Other Findings            Anesthetic Plan    ASA: 2  Anesthesia type: epidural            Anesthetic plan and risks discussed with: Patient

## 2020-12-29 NOTE — H&P
Subjective:     Faye Hogan, MRN: [id number], is a 31 y.o.  female presents with 37 weeks and elevated BP in office. . rapidly worsening course. Diarrhea X 4 weeks. See office notes on prenatal care.     Patient Active Problem List    Diagnosis Date Noted    Normal labor 12/29/2020    Diarrhea during pregnancy 12/16/2020    Pregnancy 06/18/2020    Family history of Down syndrome 06/18/2020     Past Medical History:   Diagnosis Date    Anemia     Gestational hypertension     Miscarriage       Past Surgical History:   Procedure Laterality Date    HX DILATION AND EVACUATION        [unfilled]  No Known Allergies   Social History     Tobacco Use    Smoking status: Never Smoker    Smokeless tobacco: Never Used   Substance Use Topics    Alcohol use: Not Currently      Family History   Problem Relation Age of Onset    Hypertension Mother         Prenatal Labs:   Lab Results   Component Value Date/Time    Rubella, External immune 06/18/2020    HBsAg, External negative 06/18/2020    HIV, External NR 06/18/2020    RPR, External NR 06/18/2020        Review of Systems  Constitutional: negative  Respiratory: negative  Cardiovascular: negative  Musculoskeletal:negative    Objective:     Patient Vitals for the past 8 hrs:   BP Temp Pulse Resp SpO2 Height Weight   12/29/20 1605 117/68  97       12/29/20 1603     97 %     12/29/20 1558     98 %     12/29/20 1553     98 %     12/29/20 1550 123/71  96 18 98 %     12/29/20 1548     97 %     12/29/20 1543     98 %     12/29/20 1538     99 %     12/29/20 1535 120/68  (!) 102  99 %     12/29/20 1533     98 %     12/29/20 1528     98 %     12/29/20 1523     98 %     12/29/20 1520 (!) 143/86  94       12/29/20 1519 (!) 143/86 97.6 °F (36.4 °C) 93 18 99 %     12/29/20 1518     98 %     12/29/20 1513     97 %     12/29/20 1508     98 %     12/29/20 1505 135/78  93 18 98 %   12/29/20 1503     98 %     12/29/20 1458     97 %     12/29/20 1454 134/76  96       12/29/20 1453     96 %     12/29/20 1449 132/80  96       12/29/20 1448     97 %     12/29/20 1445 130/79  (!) 102 18      12/29/20 1444 130/79  98       12/29/20 1443     97 %     12/29/20 1439 126/75  91       12/29/20 1438     98 %     12/29/20 1434 129/77  90       12/29/20 1433     98 %     12/29/20 1429 129/77  82       12/29/20 1428     98 %     12/29/20 1424 134/78  84       12/29/20 1423     98 %     12/29/20 1421 132/78 97.8 °F (36.6 °C) 93 16      12/29/20 1420 132/78  93       12/29/20 1414 136/81  88       12/29/20 1409 135/81  90       12/29/20 1406 138/81  85       12/29/20 1359 134/84  85       12/29/20 1354 133/80  89       12/29/20 1353 132/78  78       12/29/20 1351 137/80  72       12/29/20 1350 (!) 145/82  83       12/29/20 1349 (!) 144/82  86       12/29/20 1348 (!) 147/83  83       12/29/20 1347 (!) 155/85  85       12/29/20 1346 (!) 153/83  85       12/29/20 1345 (!) 152/79  83       12/29/20 1344 (!) 149/70  85       12/29/20 1335 138/79  100       12/29/20 1328 (!) 152/88  75       12/29/20 1305 136/77  97       12/29/20 1255 (!) 142/82  (!) 101       12/29/20 1246 131/80  87       12/29/20 1232 (!) 160/90  85       12/29/20 1218 (!) 161/88  89       12/29/20 1202 (!) 162/101 97.9 °F (36.6 °C) 89       12/29/20 1131 (!) 140/91  80       12/29/20 1123      5' 2\" (1.575 m) 169 lb (76.7 kg)   12/29/20 1050 (!) 162/86  90       12/29/20 1038 (!) 163/93  93           Intake/Output Summary (Last 24 hours) at 12/29/2020 1648  Last data filed at 12/29/2020 1521  Gross per 24 hour   Intake 747.54 ml   Output 50 ml   Net 697.54 ml     Visit Vitals  /68   Pulse 97   Temp 97.6 °F (36.4 °C)   Resp 18   Ht 5' 2\" (1.575 m)   Wt 169 lb (76.7 kg)   LMP 04/08/2020   SpO2 97%   Breastfeeding No   BMI 30.91 kg/m²     General appearance: alert, cooperative, no distress, appears stated age  Head: Normocephalic, without obvious abnormality, atraumatic  Back: symmetric, no curvature. ROM normal. No CVA tenderness. Lungs: clear to auscultation bilaterally  Heart: regular rate and rhythm, S1, S2 normal, no murmur, click, rub or gallop  Abdomen:  gravid at term  Pelvic: External genitalia normal, Vagina normal without discharge, cervix 5cm  Extremities: extremities normal, atraumatic, no cyanosis or edema  Pulses: 2+ and symmetric  Skin: Skin color, texture, turgor normal. No rashes or lesions      Assessment:     Active Problems:    Normal labor (12/29/2020)        37 weeks 1 day IUP with 4 weeks diarrhea, and now new onset HTN. Favorable cervix for IOL, recommended by M. All questions answered, will proceed. TIUP , beta unk  Plan:   Mag bolus, control HTN with meds, replace K+ as needed.   IV PCN X 2 doses    TIUP, DEANNA, AROM, exp mgt

## 2020-12-29 NOTE — PROGRESS NOTES
Shyanne Morataya  at bedside at . JULIAN Lal at bedside at 1337    Assisted pt to sitting up on bedside at 1335    Timeout completed at [phone] with MD, JULIAN and myself at bedside. Test dose given at 1344. Negative reaction. Dose given at 0345 [id number] 21. Pt assisted to lying back in left tilt position. EFM difficult to trace, RN remained at bedside for duration of procedure. See anesthesia record for details. See vital sign flow sheet for BP. Tolerated procedure well.

## 2020-12-29 NOTE — ANESTHESIA PROCEDURE NOTES
Epidural Block    Patient location during procedure: OB  Start time: 12/29/2020 1:39 PM  End time: 12/29/2020 1:46 PM  Reason for block: labor epidural  Staffing  Performed: attending   Anesthesiologist: Joseline Pruett MD  Preanesthetic Checklist  Completed: patient identified, IV checked, risks and benefits discussed, surgical consent, monitors and equipment checked, pre-op evaluation and timeout performed  Block Placement  Patient position: sitting  Prep: ChloraPrep  Sterility prep: mask, hand, gloves, drape and cap  Sedation level: no sedation  Patient monitoring: continuous pulse oximetry and heart rate  Approach: midline  Location: lumbar  Lumbar location: L4-L5  Epidural  Loss of resistance technique: saline  Guidance: landmark technique  Needle  Needle type: Tuohy   Needle gauge: 17 G  Needle length: 9 cm  Needle insertion depth: 6 cm  Catheter type: end hole  Catheter size: 19 G  Catheter at skin depth: 12 cm  Catheter securement method: clear occlusive dressing, liquid medical adhesive and surgical tape  Test dose: negative  Medications Administered  Lidocaine-EPINEPHrine (XYLOCAINE) 1.5 %-1:200,000 Epidural, 3.5 mL  Assessment  Block outcome: pain improved  Number of attempts: 1  Procedure assessment: patient tolerated procedure well with no complications

## 2020-12-29 NOTE — PROGRESS NOTES
Iv started, consents witnessed, bps taking  efm applied  pcn infusing  immodium given. Pt states she turned in stool specimens yesterday to the outpatient lab. Says today it has not been so much \"diarrhea\" but \"loose stools\".

## 2020-12-29 NOTE — PROGRESS NOTES
RN called MD with BP results and CBC results. TORB to start on labetalol protocol and to obtain a type and cross.  No further orders received at this time

## 2020-12-30 LAB
ERYTHROCYTE [DISTWIDTH] IN BLOOD BY AUTOMATED COUNT: 17.3 % (ref 11.9–14.6)
HCT VFR BLD AUTO: 22.3 % (ref 35.8–46.3)
HCT VFR BLD AUTO: 23.9 % (ref 35.8–46.3)
HGB BLD-MCNC: 6.6 G/DL (ref 11.7–15.4)
HGB BLD-MCNC: 7.2 G/DL (ref 11.7–15.4)
MCH RBC QN AUTO: 24.4 PG (ref 26.1–32.9)
MCHC RBC AUTO-ENTMCNC: 29.6 G/DL (ref 31.4–35)
MCV RBC AUTO: 82.3 FL (ref 79.6–97.8)
NRBC # BLD: 0.02 K/UL (ref 0–0.2)
PLATELET # BLD AUTO: 198 K/UL (ref 150–450)
PMV BLD AUTO: 11.4 FL (ref 9.4–12.3)
POTASSIUM SERPL-SCNC: 3.7 MMOL/L (ref 3.5–5.1)
RBC # BLD AUTO: 2.71 M/UL (ref 4.05–5.2)
WBC # BLD AUTO: 10 K/UL (ref 4.3–11.1)

## 2020-12-30 PROCEDURE — 74011250637 HC RX REV CODE- 250/637: Performed by: OBSTETRICS & GYNECOLOGY

## 2020-12-30 PROCEDURE — 74011250636 HC RX REV CODE- 250/636: Performed by: OBSTETRICS & GYNECOLOGY

## 2020-12-30 PROCEDURE — 84132 ASSAY OF SERUM POTASSIUM: CPT

## 2020-12-30 PROCEDURE — 85014 HEMATOCRIT: CPT

## 2020-12-30 PROCEDURE — 85027 COMPLETE CBC AUTOMATED: CPT

## 2020-12-30 PROCEDURE — 65270000029 HC RM PRIVATE

## 2020-12-30 PROCEDURE — 36415 COLL VENOUS BLD VENIPUNCTURE: CPT

## 2020-12-30 RX ORDER — NIFEDIPINE 30 MG/1
30 TABLET, EXTENDED RELEASE ORAL DAILY
Status: DISCONTINUED | OUTPATIENT
Start: 2020-12-30 | End: 2020-12-31 | Stop reason: HOSPADM

## 2020-12-30 RX ADMIN — IBUPROFEN 800 MG: 800 TABLET ORAL at 08:11

## 2020-12-30 RX ADMIN — SODIUM CHLORIDE, SODIUM LACTATE, POTASSIUM CHLORIDE, CALCIUM CHLORIDE, AND DEXTROSE MONOHYDRATE 75 ML/HR: 600; 310; 30; 20; 5 INJECTION, SOLUTION INTRAVENOUS at 01:32

## 2020-12-30 RX ADMIN — HYDROCODONE BITARTRATE AND ACETAMINOPHEN 1 TABLET: 5; 325 TABLET ORAL at 05:23

## 2020-12-30 RX ADMIN — NIFEDIPINE 30 MG: 30 TABLET, FILM COATED, EXTENDED RELEASE ORAL at 10:20

## 2020-12-30 RX ADMIN — MAGNESIUM SULFATE HEPTAHYDRATE 2 G/HR: 40 INJECTION, SOLUTION INTRAVENOUS at 00:03

## 2020-12-30 RX ADMIN — IBUPROFEN 800 MG: 800 TABLET ORAL at 21:41

## 2020-12-30 RX ADMIN — IBUPROFEN 800 MG: 800 TABLET ORAL at 15:54

## 2020-12-30 NOTE — PROGRESS NOTES
SBAR IN Report: Mother    Verbal report received from Lidya Peterson RN on this patient, who is now being transferred from  (unit) for routine progression of care. The patient is not wearing a green \"Anesthesia-Duramorph\" band. Report consisted of patient's Situation, Background, Assessment and Recommendations (SBAR).  ID bands were compared with the identification form, and verified with the patient and transferring nurse. Information from the Procedure Summary, Intake/Output and MAR and the [place] Report was reviewed with the transferring nurse; opportunity for questions and clarification provided.

## 2020-12-30 NOTE — PROGRESS NOTES
Dr. Isis Willams and Dr. Adela Jim updated on blood pressures.   New orders given see [facility]

## 2020-12-30 NOTE — PROGRESS NOTES
Dr. Sidney Camargo notified of Hgb 6.6. Pt not symptomatic. Vital signs stable see chart. Orders for  Labs to be redrawn at 1600.

## 2020-12-30 NOTE — ADDENDUM NOTE
Addendum  created 12/30/20 0919 by Da Denise, CRNA    Flowsheet accepted, Intraprocedure Flowsheets edited

## 2020-12-30 NOTE — LACTATION NOTE

## 2020-12-30 NOTE — PROGRESS NOTES
Pt appears very groggy during the delivery. Increased bleeding after delivery  And pt vomiting  Dr erazo notified. Reminded of starting Hgb of 8.3- orders a stat H&H and Mag level. May have zofran for vomiting, and may hang a #2 bag of pitocin. Lab called to come draw stat labs.

## 2020-12-30 NOTE — PROGRESS NOTES
Chart reviewed - no needs identified. SW met with patient and  while social distancing w/appropriate PPE. Patient denies any history of postpartum depression/anxiety. Patient given informational packet on  mood & anxiety disorders (resources/education). Patient does not have a PCP; referral made to [facility] PCP Coordinator. Family denies any additional needs from  at this time. Family has 's contact information should any needs/questions arise.     PETEY Crystal-DA  [facility]

## 2020-12-30 NOTE — PROGRESS NOTES
Fundal check- fundus continues to be displaced. Urine visible with fundal massage.  Padilla catheter placed, draining clear urine to gravity

## 2020-12-30 NOTE — PROGRESS NOTES
Post-Delivery Day Number 1 Progress Note    Patient doing well post-delivery day 1 from vaginal delivery without significant complaints. Pain controlled on current medication. Tolerating diet. Ambulating/Voiding without difficulty, normal lochia. Had extended course of diarrhea before delivery, none since. Vitals:  Blood pressure 118/72, pulse 80, temperature 98.7 °F (37.1 °C), resp. rate 16, height 5' 2\" (1.575 m), weight 169 lb (76.7 kg), last menstrual period 04/08/2020, SpO2 100 %, unknown if currently breastfeeding. Vital signs stable, afebrile. Exam:  Patient without distress. Abdomen soft, fundus firm at level of umbilicus, nontender. Lower extremities are negative for swelling, cords or tenderness. Lochia- moderate    Labs: Pre-delivery 8.3, post-delivery 8.2 but had atony with more than average blood loss so will check again today, along with K+    Assessment and Plan:  Patient appears to be having uncomplicated post-vaginal delivery course. Continue routine post-op care and maternal education. Watch BP, has just been moved  From L and D while on mag, now off on MIU.

## 2020-12-30 NOTE — PROGRESS NOTES
#2 iv started in left forarm. Mag and H&H stat drawn with iv start and sent for resulting. .  Dr Daniel Oro updated to pt status. No additional orders received.

## 2020-12-30 NOTE — PROGRESS NOTES
Received a call from [facility] in lab stating that hgb was 6.6, read back and verified. Primary RN ADAM Soliman RN made aware of the above.

## 2020-12-30 NOTE — LACTATION NOTE
Bedside nurse informed me that mom wants to pump. Took pump and kit into room and reviewed with mom how to use the pumpe and the breast pump kit. Mom pumped and expressed 12 ml. Infant had just taken a bottle an hour before. She stated that infant has been latching and nursing well also but she is concerned that infant is not \"getting enough\". Informed mom that she is and that she does not need to formula supplement or pump right now unless she just wants to. Also informed her that she can feed infant her expressed colostrum by 2000 tonight. Reviewed expectations of the first 24 hours as well as the second night of life. Lactation consultant will follow up tomorrow.

## 2020-12-30 NOTE — PROGRESS NOTES
SBAR OUT Report: Mother    Verbal report given to Gretchen Webber RN  on this patient, who is now being transferred to MIU (unit) for routine progression of care. The patient is not wearing a green \"Anesthesia-Duramorph\" band. Report consisted of patient's Situation, Background, Assessment and Recommendations (SBAR).  ID bands were compared with the identification form, and verified with the patient and receiving nurse. Information from the SBAR and the [address] [facility] Report was reviewed with the receiving nurse; opportunity for questions and clarification provided.

## 2020-12-30 NOTE — PROCEDURES
Delivery Note    Patient Name: Jj Balbuena  MRN: [id number]    Obstetrician:  Maxine Ledezma MD    Assistant: none    Pre-Delivery Diagnosis: Term pregnancy, Induced labor, Single fetus and Pregnancy complicated by: HTN    Post-Delivery Diagnosis: Female    Intrapartum Event: Multiple variable decelerations    Procedure: Spontaneous vaginal delivery    Epidural: YES    Monitor:  Fetal Heart Tones - External and Uterine Contractions - External    Indications for instrumental delivery: none    Estimated Blood Loss: 500    Episiotomy: none    Laceration(s):  2nd degree    Laceration(s) repair: YES    Presentation: Cephalic    Fetal Description: correa    Fetal Position: Right Occiput Anterior    Birth Weight: 7-4    Birth Length: 52    Apgar - One Minute: 8    Apgar - Five Minutes: 9    Umbilical Cord: 3 vessels present and Cord blood sent to lab for type, Rh, and Jess' test    Specimens: no           Complications:  none              Attending Attestation: I was present and scrubbed for the entire procedure

## 2020-12-30 NOTE — PROGRESS NOTES
Safety Teaching reviewed:   1. Hand hygiene prior to handling the infant. 2. Use of bulb syringe  3. Bracelets with matching numbers are placed on mother and infant  3. An infant security tag  [facility]) is placed on the infant's ankle and monitored  5. All OB nurses wear pink Employee badges - do not give your baby to anyone without proper identification. 6. Never leave the baby alone in the room. 7. The infant should be placed on their back to sleep. on a firm mattress. No toys should be placed in the crib. (safe sleep video offered to view)  8. Never shake the baby (video offered to view)  9. Infant fall prevention - do not sleep with the baby, and place the baby in the crib while ambulating. 8. Mother and Baby Care booklet given to Mother.

## 2020-12-30 NOTE — ANESTHESIA POSTPROCEDURE EVALUATION
DEANNA for labor and vaginal delivery    epidural    Anesthesia Post Evaluation      Multimodal analgesia: multimodal analgesia used between 6 hours prior to anesthesia start to PACU discharge  Patient location during evaluation: bedside  Patient participation: complete - patient participated  Level of consciousness: awake  Pain management: adequate  Airway patency: patent  Anesthetic complications: no  Cardiovascular status: acceptable and hemodynamically stable  Respiratory status: acceptable  Hydration status: acceptable  Post anesthesia nausea and vomiting:  none  Final Post Anesthesia Temperature Assessment:  Normothermia (36.0-37.5 degrees C)      INITIAL Post-op Vital signs:   Vitals Value Taken Time   /90 12/29/20 2220   Temp     Pulse 93 12/29/20 2220   Resp     SpO2 100 % 12/29/20 2219   Vitals shown include unvalidated device data.

## 2020-12-31 VITALS
TEMPERATURE: 98.1 F | RESPIRATION RATE: 16 BRPM | HEART RATE: 86 BPM | DIASTOLIC BLOOD PRESSURE: 85 MMHG | BODY MASS INDEX: 31.1 KG/M2 | HEIGHT: 62 IN | WEIGHT: 169 LBS | SYSTOLIC BLOOD PRESSURE: 140 MMHG | OXYGEN SATURATION: 97 %

## 2020-12-31 PROCEDURE — 74011250637 HC RX REV CODE- 250/637: Performed by: OBSTETRICS & GYNECOLOGY

## 2020-12-31 PROCEDURE — 2709999900 HC NON-CHARGEABLE SUPPLY

## 2020-12-31 RX ORDER — NIFEDIPINE 30 MG/1
30 TABLET, EXTENDED RELEASE ORAL DAILY
Qty: 30 TAB | Refills: 0 | Status: ON HOLD | OUTPATIENT
Start: 2021-01-01 | End: 2021-01-08 | Stop reason: SDUPTHER

## 2020-12-31 RX ORDER — IBUPROFEN 800 MG/1
800 TABLET ORAL
Qty: 30 TAB | Refills: 0 | Status: ON HOLD | OUTPATIENT
Start: 2020-12-31 | End: 2021-01-08 | Stop reason: SDUPTHER

## 2020-12-31 RX ADMIN — IBUPROFEN 800 MG: 800 TABLET ORAL at 03:41

## 2020-12-31 RX ADMIN — WITCH HAZEL 1 PAD: 500 SOLUTION RECTAL; TOPICAL at 13:04

## 2020-12-31 RX ADMIN — HYDROCODONE BITARTRATE AND ACETAMINOPHEN 1 TABLET: 5; 325 TABLET ORAL at 00:53

## 2020-12-31 RX ADMIN — WITCH HAZEL 1 PAD: 500 SOLUTION RECTAL; TOPICAL at 00:53

## 2020-12-31 RX ADMIN — IBUPROFEN 800 MG: 800 TABLET ORAL at 13:04

## 2020-12-31 RX ADMIN — NIFEDIPINE 30 MG: 30 TABLET, FILM COATED, EXTENDED RELEASE ORAL at 09:35

## 2020-12-31 NOTE — PROGRESS NOTES
Patient discharged to home after ID bands verified and 's code alert removed.  Discharge teaching complete, patient verbalizes understanding; questions encouraged. Infant placed in car seat correctly.  Stable at discharge.

## 2020-12-31 NOTE — PROGRESS NOTES
12/31/20 0053   Pain Assessment   Pain Scale 1 Numeric (0 - 10)   Pain Intensity 1 6   Pain Location 1 Abdomen;Perineum   Pain Description 1 Aching; Sore   Pain Intervention(s) 1 Medication (see MAR)     PRN Norco 5/325mg for pain.

## 2020-12-31 NOTE — LACTATION NOTE
This note was copied from a baby's chart. Individualized Feeding Plan for Breastfeeding   Lactation Services [phone]      As much as possible, hold your baby on your chest so babys bare skin is against your bare skin with a blanket covering babys back, especially 30 minutes before it is time for baby to eat. Watch for early feeding cues such as, licking lips, sucking motions, rooting, hands to mouth. Crying is a late feeding cue. Feed your baby at least 8 times in 24 hours, or more if your baby is showing feeding cues. If baby is sleepy put baby skin to skin and watch for hunger cues. To rouse baby: unwrap, undress, massage hands, feet, & back, change diaper, gently change babys position from lying to sitting. 15-20 minutes on the first breast of active breastfeeding is considered a good feeding. Good, active breastfeeding is when baby is alert, tugging the nipple, their ear may move, and you can hear swallows. Allow baby to finish the first side before changing sides. Sleeping at the breast or only brief, light sucks should not be considered a good, full breastfeed. At each feedin. Baby needs to NURSE WELL x 15-20 minutes on at least first breast, burp and offer 2nd breast at every feeding. If no sustained latch only attempt at breast for 10 minutes. If baby does not latch on and feed well on at least one side, you should:           2. Double pump for 15 minutes with breast massage and compression. Hand express for an additional 2-3 minutes per side. Pump after each feeding attempt or poor feeding, up to 8 times per day. If you are not putting baby to the breast you need to pump 8 times a day. Pump every 3 hours. 3. Give baby all of the breast milk you obtain using a straight syringe or  curved syringe.     If baby does NOT have enough wet and dirty diapers per day, is jaundiced/lethargic, or has significant weight loss AND you do NOT pump enough milk for each feeding (per volume listed below), formula supplementation may need to be used. Call lactation department /pediatrician if you have concerns. AVERAGE INTAKES OF COLOSTRUM BY HEALTHY  INFANTS:  Time  Day Intake (ml per feeding)  Based on 8 feedings per day. 1st 24 hrs  1 2-10 ml  24-48 hrs  2 5-15 ml  48-72 hrs  3 15-30 ml (0.5-1 oz)  72-96 hrs  4 30-45 ml (1-1.5oz)                          5-6      45-60 ml (1.5-2oz)                           7         75 ml (2.5oz)+ as desired    By day 7, baby will need 75 ml or 2.5 oz at each feeding based on 8 feedings per day & babys weight. (1oz = 30ml). Total milk volume needed in 24 hours by Day 7 is 19-20 oz per day based on baby's birthweight of 7lb 4oz. The more often baby eats, the less volume they need per feeding. If baby is eating more often than the minimum of 8 times per day, they may take less per feeding. Comments: If pumping, suggest using olive oil or coconut oil on your nipples before pumping to help reduce the friction. If breast feeding and continuing to offer back lots of formula after, highly encouraged to pump 10 minutes after those feeds to help your milk supply rise to level of milk mom getting used to, especially while establishing supply. Use feeding plan until follow up with pediatrician. Continue to attempt at the breast for most feeds. Pump every 3 hours if no latch. Give all pumped colostrum/breastmilk at each feeding. OUTPATIENT APPOINTMENT Suggested. Outpatient services are located on the 4th floor at [facility]. Check in at the 4th floor registration desk (the same one you used when you came to have your baby).   Call for questions [phone]

## 2020-12-31 NOTE — PROGRESS NOTES
12/30/20 5599   Pain Assessment   Pain Scale 1 Numeric (0 - 10)   Pain Intensity 1 4   Pain Location 1 Head   Pain Description 1 Aching   Pain Intervention(s) 1 Medication (see MAR)     PRN Motrin 800mg for pian

## 2020-12-31 NOTE — PROGRESS NOTES
12/31/20 0341   Pain Assessment   Pain Scale 1 Numeric (0 - 10)   Pain Intensity 1 4   Pain Location 1 Head   Pain Description 1 Aching   Pain Intervention(s) 1 Medication (see MAR)     PRN Motrin 800mg for pain

## 2020-12-31 NOTE — LACTATION NOTE
This note was copied from a baby's chart. In to see mom and infant for discharge. Mom states baby will latch, but gets very sleepy at breast and will not feed for more than a few minutes. Reviewed different tips to show her how to keep baby awake and active at breast. Mom had just tried to breast feed baby before I walked in to go over feeding plan. Offered to assist mom w/ breast feeding/observation, etc, but she declined. Reviewed printed feeding plan w/ both parents. They verbalized understanding how to use for guidance at home. Completed pump rental paperwork as they have not gotten one from insurance yet. Reviewed discharge info and how to manage period of engorgement. No further needs at this time.

## 2020-12-31 NOTE — DISCHARGE SUMMARY
Post-Partum Day Number 2 Progress/Discharge Note    Patient doing well post-partum without significant complaint. Voiding without difficulty, normal lochia, positive flatus. No further diarrhea. Vitals:    Patient Vitals for the past 8 hrs:   BP Temp Pulse Resp SpO2   20 0742 119/73 97.6 °F (36.4 °C) 81 16 97 %   20 0315 105/69 98.2 °F (36.8 °C) 79 16 96 %     Temp (24hrs), Av.4 °F (36.9 °C), Min:97.6 °F (36.4 °C), Max:99.2 °F (37.3 °C)      Vital signs stable, afebrile. Exam:  Patient without distress. Abdomen soft, fundus firm at level of umbilicus, non tender               Perineum with normal lochia noted. Lower extremities are negative for swelling, cords or tenderness. Lab/Data Review: All lab results for the last 24 hours reviewed. Assessment and Plan:  Patient appears to be having uncomplicated post-partum course. Continue routine perineal care and maternal education. Plan discharge for today with follow up in our office in 1 WEEK for BP check, Hgb check and to assess her diarrhea situation.

## 2020-12-31 NOTE — DISCHARGE INSTRUCTIONS
Patient Education        After Your Delivery (the Postpartum Period): Care Instructions  Your Care Instructions     Congratulations on the birth of your baby. Like pregnancy, the  period can be a time of excitement, jerry, and exhaustion. You may look at your wondrous little baby and feel happy. You may also be overwhelmed by your new sleep hours and new responsibilities. At first, babies often sleep during the days and are awake at night. They do not have a pattern or routine. They may make sudden gasps, jerk themselves awake, or look like they have crossed eyes. These are all normal, and they may even make you smile. In these first weeks after delivery, try to take good care of yourself. It may take 4 to 6 weeks to feel like yourself again, and possibly longer if you had a  birth. You will likely feel very tired for several weeks. Your days will be full of ups and downs, but lots of jerry as well. Follow-up care is a key part of your treatment and safety. Be sure to make and go to all appointments, and call your doctor if you are having problems. It's also a good idea to know your test results and keep a list of the medicines you take. How can you care for yourself at home? Take care of your body after delivery  · Use pads instead of tampons for the bloody flow that may last as long as 2 weeks. · Ease cramps with ibuprofen (Advil, Motrin). · Ease soreness of hemorrhoids and the area between your vagina and rectum with ice compresses or witch hazel pads. · Ease constipation by drinking lots of fluid and eating high-fiber foods. Ask your doctor about over-the-counter stool softeners. · Cleanse yourself with a gentle squeeze of warm water from a bottle instead of wiping with toilet paper. · Take a sitz bath in warm water several times a day. · Wear a good nursing bra. Ease sore and swollen breasts with warm, wet washcloths.   · If you are not breastfeeding, use ice rather than heat for breast soreness. · Your period may not start for several months if you are breastfeeding. You may bleed more, and longer at first, than you did before you got pregnant. · Wait until you are healed (about 4 to 6 weeks) before you have sexual intercourse. Your doctor will tell you when it is okay to have sex. · Try not to travel with your baby for 5 or 6 weeks. If you take a long car trip, make frequent stops to walk around and stretch. Avoid exhaustion  · Rest every day. Try to nap when your baby naps. · Ask another adult to be with you for a few days after delivery. · Plan for  if you have other children. · Stay flexible so you can eat at odd hours and sleep when you need to. Both you and your baby are making new schedules. · Plan small trips to get out of the house. Change can make you feel less tired. · Ask for help with housework, cooking, and shopping. Remind yourself that your job is to care for your baby. Know about help for postpartum depression  · \"Baby blues\" are common for the first 1 to 2 weeks after birth. You may cry or feel sad or irritable for no reason. · Rest whenever you can. Being tired makes it harder to handle your emotions. · Go for walks with your baby. · Talk to your partner, friends, and family about your feelings. · If your symptoms last for more than a few weeks, or if you feel very depressed, ask your doctor for help. · Postpartum depression can be treated. Support groups and counseling can help. Sometimes medicine can also help. Stay healthy  · Eat healthy foods so you have more energy and lose extra baby pounds. · If you breastfeed, avoid drugs. If you quit smoking during pregnancy, try to stay smoke-free. If you choose to have a drink now and then, have only one drink, and limit the number of occasions that you have a drink. Wait to breastfeed at least 2 hours after you have a drink to reduce the amount of alcohol the baby may get in the milk.   · Start daily exercise after 4 to 6 weeks, but rest when you feel tired. · Learn exercises to tone your belly. Do Kegel exercises to regain strength in your pelvic muscles. You can do these exercises while you stand or sit. ? Squeeze the same muscles you would use to stop your urine. Your belly and thighs should not move. ? Hold the squeeze for 3 seconds, and then relax for 3 seconds. ? Start with 3 seconds. Then add 1 second each week until you are able to squeeze for 10 seconds. ? Repeat the exercise 10 to 15 times for each session. Do three or more sessions each day. · Find a class for new mothers and new babies that has an exercise time. · If you had a  birth, give yourself a bit more time before you exercise, and be careful. When should you call for help? Call  911 anytime you think you may need emergency care. For example, call if:    · You have thoughts of harming yourself, your baby, or another person.     · You passed out (lost consciousness).     · You have chest pain, are short of breath, or cough up blood.     · You have a seizure. Call your doctor now or seek immediate medical care if:    · You have severe vaginal bleeding. This means you are passing blood clots and soaking through a pad each hour for 2 or more hours.     · You are dizzy or lightheaded, or you feel like you may faint.     · You have a fever.     · You have new or more belly pain.     · You have signs of a blood clot in your leg (called a deep vein thrombosis), such as:  ? Pain in the calf, back of the knee, thigh, or groin. ? Redness and swelling in your leg or groin.     · You have signs of preeclampsia, such as:  ? Sudden swelling of your face, hands, or feet. ? New vision problems (such as dimness, blurring, or seeing spots). ? A severe headache.    Watch closely for changes in your health, and be sure to contact your doctor if:    · Your vaginal bleeding seems to be getting heavier.     · You have new or worse vaginal discharge.     · You feel sad, anxious, or hopeless for more than a few days.     · You do not get better as expected. Where can you learn more? Go to http://www.Soompi.com/  Enter A461 in the search box to learn more about \"After Your Delivery (the Postpartum Period): Care Instructions. \"  Current as of: February 11, 2020               Content Version: 12.6  © 2006-2020 [employer]. Care instructions adapted under license by [employer] (which disclaims liability or warranty for this information). If you have questions about a medical condition or this instruction, always ask your healthcare professional. [place] [address] any warranty or liability for your use of this information. Patient Education        High Blood Pressure in Pregnancy: Care Instructions  Your Care Instructions     High blood pressure (hypertension) means that the force of blood against your artery walls is too strong. Mild high blood pressure during pregnancy is not usually dangerous. Your doctor will probably just want to watch you closely. But when blood pressure is very high, it can reduce oxygen to your baby. This can affect how well your baby grows. High blood pressure also means that you are at higher risk for:  · Preeclampsia. This is a problem that includes high blood pressure and damage to your liver or kidneys. It can also reduce how much oxygen your baby gets. In some cases, it leads to eclampsia. Eclampsia causes seizures. · Placental abruption. This is a problem when the placenta separates from the uterus before birth. It prevents the baby from getting enough oxygen and nutrients. Sometimes it can cause death for the baby and the mother. To prevent problems for you or your baby, you will have to check your blood pressure often. You will do this until after your baby is born.   If your blood pressure rises suddenly or is very high during your pregnancy, your doctor may prescribe medicines. They can usually control blood pressure. If your blood pressure affects your or your baby's health, your doctor may need to deliver your baby early. After your baby is born, your blood pressure will probably improve. But sometimes blood pressure problems continue after birth. Follow-up care is a key part of your treatment and safety. Be sure to make and go to all appointments, and call your doctor if you are having problems. It's also a good idea to know your test results and keep a list of the medicines you take. How can you care for yourself at home? · Take and write down your blood pressure at home if your doctor says to. · Take your medicines exactly as prescribed. Call your doctor if you think you are having a problem with your medicine. · Do not smoke. This is one of the best things you can do to help your baby be healthy. If you need help quitting, talk to your doctor about stop-smoking programs and medicines. These can increase your chances of quitting for good. · Don't gain too much weight during pregnancy. Talk to your doctor about how much weight gain is healthy. · Eat a healthy diet. · If your doctor says it's okay, get regular exercise. Walking or swimming several times a week can be healthy for you and your baby. · Reduce stress, and find time to relax. This is very important if you continue to work or have a busy schedule. It's also important if you have small children at home. When should you call for help? Call 911 anytime you think you may need emergency care. For example, call if:    · You passed out (lost consciousness).     · You have a seizure. Call your doctor now or seek immediate medical care if:    · You have symptoms of preeclampsia, such as:  ? Sudden swelling of your face, hands, or feet. ? New vision problems (such as dimness, blurring, or seeing spots).   ? A severe headache.     · Your blood pressure is higher than it should be or rises suddenly.     · You have new nausea or vomiting.     · You think that you are in labor.     · You have pain in your belly or pelvis. Watch closely for changes in your health, and be sure to contact your doctor if:    · You gain weight rapidly. Where can you learn more? Go to http://www.gray.com/  Enter A052 in the search box to learn more about \"High Blood Pressure in Pregnancy: Care Instructions. \"  Current as of: February 11, 2020               Content Version: 12.6  © 2006-2020 [employer]. Care instructions adapted under license by [employer] (which disclaims liability or warranty for this information). If you have questions about a medical condition or this instruction, always ask your healthcare professional. [address] any warranty or liability for your use of this information.

## 2021-01-01 LAB
BACTERIA SPEC CULT: NORMAL
SERVICE CMNT-IMP: NORMAL

## 2021-01-02 LAB
ABO + RH BLD: NORMAL
BLD PROD TYP BPU: NORMAL
BLOOD GROUP ANTIBODIES SERPL: NORMAL
BPU ID: NORMAL
CROSSMATCH RESULT,%XM: NORMAL
SPECIMEN EXP DATE BLD: NORMAL
STATUS OF UNIT,%ST: NORMAL
UNIT DIVISION, %UDIV: 0

## 2021-01-07 ENCOUNTER — HOSPITAL ENCOUNTER (OUTPATIENT)
Age: 31
Discharge: HOME | End: 2021-01-08
Attending: OBSTETRICS & GYNECOLOGY | Admitting: OBSTETRICS & GYNECOLOGY
Payer: MEDICAID

## 2021-01-07 PROBLEM — Z34.90 PREGNANCY: Status: RESOLVED | Noted: 2020-06-18 | Resolved: 2021-01-07

## 2021-01-07 PROBLEM — Z37.9 NORMAL LABOR: Status: RESOLVED | Noted: 2020-12-29 | Resolved: 2021-01-07

## 2021-01-07 PROBLEM — R51.9 HEADACHE IN PREGNANCY, POSTPARTUM: Status: ACTIVE | Noted: 2021-01-07

## 2021-01-07 PROBLEM — R19.7 DIARRHEA DURING PREGNANCY: Status: RESOLVED | Noted: 2020-12-16 | Resolved: 2021-01-07

## 2021-01-07 PROBLEM — O26.899 DIARRHEA DURING PREGNANCY: Status: RESOLVED | Noted: 2020-12-16 | Resolved: 2021-01-07

## 2021-01-07 LAB
ALBUMIN SERPL-MCNC: 3 G/DL (ref 3.5–5)
ALBUMIN/GLOB SERPL: 0.7 {RATIO} (ref 1.2–3.5)
ALP SERPL-CCNC: 135 U/L (ref 50–136)
ALT SERPL-CCNC: 23 U/L (ref 12–65)
ANION GAP SERPL CALC-SCNC: 7 MMOL/L (ref 7–16)
AST SERPL-CCNC: 11 U/L (ref 15–37)
BASOPHILS # BLD: 0.1 K/UL (ref 0–0.2)
BASOPHILS NFR BLD: 1 % (ref 0–2)
BILIRUB SERPL-MCNC: 0.2 MG/DL (ref 0.2–1.1)
BUN SERPL-MCNC: 17 MG/DL (ref 6–23)
CALCIUM SERPL-MCNC: 9 MG/DL (ref 8.3–10.4)
CHLORIDE SERPL-SCNC: 107 MMOL/L (ref 98–107)
CO2 SERPL-SCNC: 25 MMOL/L (ref 21–32)
CREAT SERPL-MCNC: 0.61 MG/DL (ref 0.6–1)
DIFFERENTIAL METHOD BLD: ABNORMAL
EOSINOPHIL # BLD: 0.2 K/UL (ref 0–0.8)
EOSINOPHIL NFR BLD: 2 % (ref 0.5–7.8)
ERYTHROCYTE [DISTWIDTH] IN BLOOD BY AUTOMATED COUNT: 17.9 % (ref 11.9–14.6)
GLOBULIN SER CALC-MCNC: 4.2 G/DL (ref 2.3–3.5)
GLUCOSE SERPL-MCNC: 87 MG/DL (ref 65–100)
HCT VFR BLD AUTO: 28.6 % (ref 35.8–46.3)
HGB BLD-MCNC: 8.4 G/DL (ref 11.7–15.4)
IMM GRANULOCYTES # BLD AUTO: 0.2 K/UL (ref 0–0.5)
IMM GRANULOCYTES NFR BLD AUTO: 2 % (ref 0–5)
LDH SERPL L TO P-CCNC: 218 U/L (ref 100–190)
LYMPHOCYTES # BLD: 3.4 K/UL (ref 0.5–4.6)
LYMPHOCYTES NFR BLD: 33 % (ref 13–44)
MCH RBC QN AUTO: 23.8 PG (ref 26.1–32.9)
MCHC RBC AUTO-ENTMCNC: 29.4 G/DL (ref 31.4–35)
MCV RBC AUTO: 81 FL (ref 79.6–97.8)
MONOCYTES # BLD: 1 K/UL (ref 0.1–1.3)
MONOCYTES NFR BLD: 10 % (ref 4–12)
NEUTS SEG # BLD: 5.4 K/UL (ref 1.7–8.2)
NEUTS SEG NFR BLD: 53 % (ref 43–78)
NRBC # BLD: 0 K/UL (ref 0–0.2)
PLATELET # BLD AUTO: 398 K/UL (ref 150–450)
PMV BLD AUTO: 9.9 FL (ref 9.4–12.3)
POTASSIUM SERPL-SCNC: 3.5 MMOL/L (ref 3.5–5.1)
PROT SERPL-MCNC: 7.2 G/DL (ref 6.3–8.2)
RBC # BLD AUTO: 3.53 M/UL (ref 4.05–5.2)
SODIUM SERPL-SCNC: 139 MMOL/L (ref 136–145)
URATE SERPL-MCNC: 3.9 MG/DL (ref 2.6–6)
WBC # BLD AUTO: 10.1 K/UL (ref 4.3–11.1)

## 2021-01-07 PROCEDURE — 74011250637 HC RX REV CODE- 250/637: Performed by: OBSTETRICS & GYNECOLOGY

## 2021-01-07 PROCEDURE — 80053 COMPREHEN METABOLIC PANEL: CPT

## 2021-01-07 PROCEDURE — 85025 COMPLETE CBC W/AUTO DIFF WBC: CPT

## 2021-01-07 PROCEDURE — 84550 ASSAY OF BLOOD/URIC ACID: CPT

## 2021-01-07 PROCEDURE — 74011250637 HC RX REV CODE- 250/637

## 2021-01-07 PROCEDURE — 83615 LACTATE (LD) (LDH) ENZYME: CPT

## 2021-01-07 RX ORDER — IBUPROFEN 600 MG/1
600 TABLET ORAL
Status: COMPLETED | OUTPATIENT
Start: 2021-01-08 | End: 2021-01-07

## 2021-01-07 RX ORDER — NIFEDIPINE 10 MG/1
10 CAPSULE ORAL
Status: COMPLETED | OUTPATIENT
Start: 2021-01-08 | End: 2021-01-07

## 2021-01-07 RX ORDER — CEPHALEXIN 500 MG/1
500 CAPSULE ORAL EVERY 6 HOURS
Status: DISCONTINUED | OUTPATIENT
Start: 2021-01-08 | End: 2021-01-08 | Stop reason: HOSPADM

## 2021-01-07 RX ORDER — NIFEDIPINE 10 MG/1
CAPSULE ORAL
Status: COMPLETED
Start: 2021-01-07 | End: 2021-01-07

## 2021-01-07 RX ADMIN — NIFEDIPINE 10 MG: 10 CAPSULE ORAL at 22:44

## 2021-01-07 RX ADMIN — IBUPROFEN 600 MG: 600 TABLET, FILM COATED ORAL at 23:28

## 2021-01-07 RX ADMIN — CEPHALEXIN 500 MG: 500 CAPSULE ORAL at 23:22

## 2021-01-08 VITALS
OXYGEN SATURATION: 99 % | BODY MASS INDEX: 27.05 KG/M2 | RESPIRATION RATE: 16 BRPM | HEIGHT: 62 IN | DIASTOLIC BLOOD PRESSURE: 53 MMHG | SYSTOLIC BLOOD PRESSURE: 110 MMHG | HEART RATE: 110 BPM | WEIGHT: 147 LBS | TEMPERATURE: 100.2 F

## 2021-01-08 PROCEDURE — 99285 EMERGENCY DEPT VISIT HI MDM: CPT

## 2021-01-08 RX ORDER — CEPHALEXIN 500 MG/1
500 CAPSULE ORAL EVERY 6 HOURS
Qty: 28 CAP | Refills: 0 | Status: SHIPPED | OUTPATIENT
Start: 2021-01-08 | End: 2021-01-15

## 2021-01-08 RX ORDER — IBUPROFEN 800 MG/1
800 TABLET ORAL
Qty: 30 TAB | Refills: 1 | Status: SHIPPED | OUTPATIENT
Start: 2021-01-08 | End: 2021-02-10

## 2021-01-08 RX ORDER — NIFEDIPINE 30 MG/1
30 TABLET, EXTENDED RELEASE ORAL 2 TIMES DAILY
Qty: 60 TAB | Refills: 1 | Status: SHIPPED | OUTPATIENT
Start: 2021-01-08 | End: 2021-02-10

## 2021-01-08 NOTE — PROGRESS NOTES
Pt to [facility] with complaint of headache and hypertension at home. Pt is 1 week PP. OB hospitalist notified.

## 2021-01-08 NOTE — DISCHARGE INSTRUCTIONS
Patient Education        Learning About Preeclampsia After Childbirth  What is preeclampsia? A woman with preeclampsia has blood pressure that is higher than usual. She may also have other serious symptoms. Preeclampsia can be dangerous. When it is severe, it can cause seizures (eclampsia) or liver or kidney damage. When the liver is affected, some women get HELLP syndrome, a blood-clotting and bleeding problem. HELLP can come on quickly and can be deadly. This is why your doctor checks you and your baby often. Preeclampsia usually occurs after 20 weeks of pregnancy. Most often, it starts near the end of pregnancy and goes away after childbirth. But symptoms may last a few weeks or more and can get worse after delivery. Rarely, symptoms of preeclampsia don't show up until days or even weeks after childbirth. What are the symptoms? Mild preeclampsia usually doesn't cause symptoms. But preeclampsia can cause rapid weight gain and sudden swelling of the hands and face. Severe preeclampsia does cause symptoms. It can cause a very bad headache and trouble seeing and breathing. It also can cause belly pain. You may also urinate less than usual.  If you have new preeclampsia symptoms after you go home from the hospital, call your doctor right away. What can you expect after you have had preeclampsia? In the hospital  After the baby and the placenta are delivered, preeclampsia usually starts to improve. Most women get better in the first few days after childbirth. After having preeclampsia, you still have a risk of seizures for a day or more after childbirth. (Very rarely, seizures happen later on.) So your doctor may have you take magnesium sulfate for a day or more to prevent seizures. You may also take medicine to lower your blood pressure. When you go home  Your blood pressure will most likely return to normal a few days after delivery.  Your doctor will want to check your blood pressure sometime in the first week after you leave the hospital.  Some women still have high blood pressure 6 weeks after childbirth. But most return to normal levels over the long term. · Take and record your blood pressure at home if your doctor tells you to. ? Learn the importance of the two measures of blood pressure (such as 120 over 80, or 120/80). The first number is the systolic pressure. This is the force of blood on the artery walls as the heart pumps. The second number is the diastolic pressure. This is the force of blood on the artery walls between heartbeats, when the heart is at rest. You have a choice of monitors to use. § Manual monitor: You pump up the cuff and use a stethoscope to listen for your pulse. § Electronic monitor: The cuff inflates, and a gauge shows your pulse rate. ? To take your blood pressure:  § Ask your doctor to check your blood pressure monitor to be sure that it is accurate and that the cuff fits you. Also ask your doctor to watch you use it, to make sure that you are using it right. § You should not eat, use tobacco products, or use medicine known to raise blood pressure (such as some nasal decongestant sprays) before you take your blood pressure. § Avoid taking your blood pressure if you have just exercised. Also avoid taking it if you are nervous or upset. Rest at least 15 minutes before you take your blood pressure. · Be safe with medicines. If you take medicine, take it exactly as prescribed. Call your doctor if you think you are having a problem with your medicine. · Do not smoke. Quitting smoking will help improve your baby's growth and health. If you need help quitting, talk to your doctor about stop-smoking programs and medicines. These can increase your chances of quitting for good. · Eat a balanced and healthy diet that has lots of fruits and vegetables.   Long-term health  After you have had preeclampsia, you have a higher-than-average risk of heart disease, stroke, and kidney disease. This may be because the same things that cause preeclampsia also cause heart and kidney disease. To protect your health, work with your doctor on living a heart-healthy lifestyle and getting the checkups you need. Your doctor may also want you to check your blood pressure at home. Follow-up care is a key part of your treatment and safety. Be sure to make and go to all appointments, and call your doctor if you are having problems. It's also a good idea to know your test results and keep a list of the medicines you take. Where can you learn more? Go to http://www.sunshine.com/  Enter Q718 in the search box to learn more about \"Learning About Preeclampsia After Childbirth. \"  Current as of: February 11, 2020               Content Version: 12.6  © 2006-2020 [employer]. Care instructions adapted under license by [employer] (which disclaims liability or warranty for this information). If you have questions about a medical condition or this instruction, always ask your healthcare professional. [address] any warranty or liability for your use of this information.

## 2021-01-08 NOTE — ED PROVIDER NOTES
Chief Complaint: headache and elevated bp at home      31 y.o. female  who is 9 days postpartum from vaginal delivery. Pt was diagnosed with pre eclampsia and admitted for magnesium and induction of labor. She was discharged home with procardia XL 30 mg daily. She has been taking this irregularly. She saw her OB earlier today who recommended she stop her meds and f/u one week. Pt reports her edema has completely resolved. Pt checked her bp at home and noted it was elevated. She checked her bp because she had a headache. Pt has a low grade temp in triage. She denies cough, sore throat, shortness of breath. She denies any perineal pain. Initially, she denies any problems, but during exam she mentions some breast tenderness. HISTORY:    Social History     Substance and Sexual Activity   Sexual Activity Yes    Partners: Male    Birth control/protection: None     Patient's last menstrual period was 2020.     Social History     Socioeconomic History    Marital status:      Spouse name: Not on file    Number of children: Not on file    Years of education: Not on file    Highest education level: Not on file   Occupational History    Not on file   Social Needs    Financial resource strain: Not on file    Food insecurity     Worry: Not on file     Inability: Not on file    Transportation needs     Medical: Not on file     Non-medical: Not on file   Tobacco Use    Smoking status: Never Smoker    Smokeless tobacco: Never Used   Substance and Sexual Activity    Alcohol use: Not Currently    Drug use: Not Currently    Sexual activity: Yes     Partners: Male     Birth control/protection: None   Lifestyle    Physical activity     Days per week: Not on file     Minutes per session: Not on file    Stress: Not on file   Relationships    Social connections     Talks on phone: Not on file     Gets together: Not on file     Attends Jew service: Not on file     Active member of club or organization: Not on file     Attends meetings of clubs or organizations: Not on file     Relationship status: Not on file    Intimate partner violence     Fear of current or ex partner: Not on file     Emotionally abused: Not on file     Physically abused: Not on file     Forced sexual activity: Not on file   Other Topics Concern     Service Not Asked    Blood Transfusions Not Asked    Caffeine Concern Not Asked    Occupational Exposure Not Asked    Hobby Hazards Not Asked    Sleep Concern Not Asked    Stress Concern Not Asked    Weight Concern Not Asked    Special Diet Not Asked    Back Care Not Asked    Exercise Not Asked    Bike Helmet Not Asked   [address] Not Asked    Self-Exams Not Asked   Social History Narrative    Not on file       Past Surgical History:   Procedure Laterality Date    HX DILATION AND EVACUATION         Past Medical History:   Diagnosis Date    Anemia     Gestational hypertension     Gestational hypertension, third trimester 12/29/2020    Miscarriage          ROS:  A 12 point review of symptoms negative except for chief complaint as described above. PHYSICAL EXAM:  Blood pressure (!) 158/97, pulse 86, temperature 100.2 °F (37.9 °C), resp. rate 16, height 5' 2\" (1.575 m), weight 66.7 kg (147 lb), last menstrual period 04/08/2020, SpO2 99 %, unknown if currently breastfeeding. Constitutional: The patient appears well, alert, oriented x 3. Cardiovascular: Heart RRR, no murmurs.    Respiratory: Lungs clear, no respiratory distress  Breast- mild engorgement bilat, left breast with quarter size area of redness and tenderness  GI: Abdomen soft, nontender, no guarding  No fundal tenderness  Musculoskeletal: no cva tenderness  Upper ext: no edema, reflexes +2  Lower ext: no edema, neg soy's, reflexes +2  Skin: no rashes or lesions  Psychiatric:Mood/ Affect: appropriate  Recent Results (from the past 12 hour(s))   CBC WITH AUTOMATED DIFF    Collection Time: 21 11:12 PM   Result Value Ref Range    WBC 10.1 4.3 - 11.1 K/uL    RBC 3.53 (L) 4.05 - 5.2 M/uL    HGB 8.4 (L) 11.7 - 15.4 g/dL    HCT 28.6 (L) 35.8 - 46.3 %    MCV 81.0 79.6 - 97.8 FL    MCH 23.8 (L) 26.1 - 32.9 PG    MCHC 29.4 (L) 31.4 - 35.0 g/dL    RDW 17.9 (H) 11.9 - 14.6 %    PLATELET 932 753 - 581 K/uL    MPV 9.9 9.4 - 12.3 FL    ABSOLUTE NRBC 0.00 0.0 - 0.2 K/uL    DF AUTOMATED      NEUTROPHILS 53 43 - 78 %    LYMPHOCYTES 33 13 - 44 %    MONOCYTES 10 4.0 - 12.0 %    EOSINOPHILS 2 0.5 - 7.8 %    BASOPHILS 1 0.0 - 2.0 %    IMMATURE GRANULOCYTES 2 0.0 - 5.0 %    ABS. NEUTROPHILS 5.4 1.7 - 8.2 K/UL    ABS. LYMPHOCYTES 3.4 0.5 - 4.6 K/UL    ABS. MONOCYTES 1.0 0.1 - 1.3 K/UL    ABS. EOSINOPHILS 0.2 0.0 - 0.8 K/UL    ABS. BASOPHILS 0.1 0.0 - 0.2 K/UL    ABS. IMM. GRANS. 0.2 0.0 - 0.5 K/UL   METABOLIC PANEL, COMPREHENSIVE    Collection Time: 21 11:12 PM   Result Value Ref Range    Sodium 139 136 - 145 mmol/L    Potassium 3.5 3.5 - 5.1 mmol/L    Chloride 107 98 - 107 mmol/L    CO2 25 21 - 32 mmol/L    Anion gap 7 7 - 16 mmol/L    Glucose 87 65 - 100 mg/dL    BUN 17 6 - 23 MG/DL    Creatinine 0.61 0.6 - 1.0 MG/DL    GFR est AA >60 >60 ml/min/1.73m2    GFR est non-AA >60 >60 ml/min/1.73m2    Calcium 9.0 8.3 - 10.4 MG/DL    Bilirubin, total 0.2 0.2 - 1.1 MG/DL    ALT (SGPT) 23 12 - 65 U/L    AST (SGOT) 11 (L) 15 - 37 U/L    Alk. phosphatase 135 50 - 136 U/L    Protein, total 7.2 6.3 - 8.2 g/dL    Albumin 3.0 (L) 3.5 - 5.0 g/dL    Globulin 4.2 (H) 2.3 - 3.5 g/dL    A-G Ratio 0.7 (L) 1.2 - 3.5     LD    Collection Time: 21 11:12 PM   Result Value Ref Range     (H) 100 - 190 U/L   URIC ACID    Collection Time: 21 11:12 PM   Result Value Ref Range    Uric acid 3.9 2.6 - 6.0 MG/DL         I personally reviewed pt's medical record including relevant labs      Assessment/Plan:  32 yo  s/p vag delivery 10 days ago.  Pregnancy complicated by pre-eclampsia  Pt was discharged home with nifedipine 30 mg XL daily. She stopped taking, but had a headache and elevated bp at home so she came in. imporved with 20 mg po nifedipine  Will have pt restart nifedipine 30 mg xl bid and follow up next week  No edema or shortness of breath, normal reflexes  Noted to have temp of 100.2 in triage. No specific c/o but appears to have an early mastitis  Will treat with keflex and have pt f/u next week for bp and mastitis  Instructions to return if worse.

## 2021-01-08 NOTE — PROGRESS NOTES
See MAR for medications given. Pt given instructions regarding mastitis care as well as increasing procardia dosage until next follow up appt. Pt left ambulatory in stable condition with sig other.

## 2022-01-25 PROBLEM — R51.9 HEADACHE IN PREGNANCY, POSTPARTUM: Status: RESOLVED | Noted: 2021-01-07 | Resolved: 2022-01-25

## 2022-01-25 PROBLEM — Z87.59 HISTORY OF GESTATIONAL HYPERTENSION: Status: ACTIVE | Noted: 2022-01-25

## 2022-01-25 PROBLEM — U07.1 COVID-19: Status: ACTIVE | Noted: 2022-01-25

## 2022-01-25 PROBLEM — O09.90 HIGH-RISK PREGNANCY: Status: ACTIVE | Noted: 2022-01-25

## 2022-01-25 PROBLEM — Z92.29 COVID-19 VACCINE SERIES COMPLETED: Status: ACTIVE | Noted: 2022-01-25

## 2022-01-25 PROBLEM — O13.3 GESTATIONAL HYPERTENSION, THIRD TRIMESTER: Status: RESOLVED | Noted: 2020-12-29 | Resolved: 2022-01-25

## 2022-03-05 ENCOUNTER — HOSPITAL ENCOUNTER (EMERGENCY)
Age: 32
Discharge: HOME | End: 2022-03-05
Attending: EMERGENCY MEDICINE
Payer: MEDICAID

## 2022-03-05 VITALS
TEMPERATURE: 98.5 F | HEIGHT: 63 IN | RESPIRATION RATE: 18 BRPM | BODY MASS INDEX: 26.58 KG/M2 | WEIGHT: 150 LBS | SYSTOLIC BLOOD PRESSURE: 127 MMHG | OXYGEN SATURATION: 100 % | DIASTOLIC BLOOD PRESSURE: 79 MMHG | HEART RATE: 97 BPM

## 2022-03-05 DIAGNOSIS — O13.2 GESTATIONAL HYPERTENSION, SECOND TRIMESTER: Primary | ICD-10-CM

## 2022-03-05 LAB
ALBUMIN SERPL-MCNC: 2.8 G/DL (ref 3.5–5)
ALBUMIN/GLOB SERPL: 0.7 {RATIO} (ref 1.2–3.5)
ALP SERPL-CCNC: 57 U/L (ref 50–136)
ALT SERPL-CCNC: 17 U/L (ref 12–65)
ANION GAP SERPL CALC-SCNC: 4 MMOL/L (ref 7–16)
AST SERPL-CCNC: 19 U/L (ref 15–37)
BASOPHILS # BLD: 0 K/UL (ref 0–0.2)
BASOPHILS NFR BLD: 0 % (ref 0–2)
BILIRUB SERPL-MCNC: 0.2 MG/DL (ref 0.2–1.1)
BUN SERPL-MCNC: 6 MG/DL (ref 6–23)
CALCIUM SERPL-MCNC: 8.4 MG/DL (ref 8.3–10.4)
CHLORIDE SERPL-SCNC: 107 MMOL/L (ref 98–107)
CO2 SERPL-SCNC: 25 MMOL/L (ref 21–32)
CREAT SERPL-MCNC: 0.36 MG/DL (ref 0.6–1)
DIFFERENTIAL METHOD BLD: ABNORMAL
EOSINOPHIL # BLD: 0 K/UL (ref 0–0.8)
EOSINOPHIL NFR BLD: 0 % (ref 0.5–7.8)
ERYTHROCYTE [DISTWIDTH] IN BLOOD BY AUTOMATED COUNT: 15.4 % (ref 11.9–14.6)
GLOBULIN SER CALC-MCNC: 4.1 G/DL (ref 2.3–3.5)
GLUCOSE SERPL-MCNC: 91 MG/DL (ref 65–100)
HCT VFR BLD AUTO: 34.7 % (ref 35.8–46.3)
HGB BLD-MCNC: 11.2 G/DL (ref 11.7–15.4)
IMM GRANULOCYTES # BLD AUTO: 0 K/UL (ref 0–0.5)
IMM GRANULOCYTES NFR BLD AUTO: 1 % (ref 0–5)
LYMPHOCYTES # BLD: 3.2 K/UL (ref 0.5–4.6)
LYMPHOCYTES NFR BLD: 43 % (ref 13–44)
MAGNESIUM SERPL-MCNC: 2 MG/DL (ref 1.8–2.4)
MCH RBC QN AUTO: 27.1 PG (ref 26.1–32.9)
MCHC RBC AUTO-ENTMCNC: 32.3 G/DL (ref 31.4–35)
MCV RBC AUTO: 84 FL (ref 79.6–97.8)
MONOCYTES # BLD: 0.9 K/UL (ref 0.1–1.3)
MONOCYTES NFR BLD: 12 % (ref 4–12)
NEUTS SEG # BLD: 3.3 K/UL (ref 1.7–8.2)
NEUTS SEG NFR BLD: 44 % (ref 43–78)
NRBC # BLD: 0 K/UL (ref 0–0.2)
PLATELET # BLD AUTO: 241 K/UL (ref 150–450)
PMV BLD AUTO: 11.4 FL (ref 9.4–12.3)
POTASSIUM SERPL-SCNC: 4 MMOL/L (ref 3.5–5.1)
PROT SERPL-MCNC: 6.9 G/DL (ref 6.3–8.2)
RBC # BLD AUTO: 4.13 M/UL (ref 4.05–5.2)
SODIUM SERPL-SCNC: 136 MMOL/L (ref 136–145)
WBC # BLD AUTO: 7.4 K/UL (ref 4.3–11.1)

## 2022-03-05 PROCEDURE — 81003 URINALYSIS AUTO W/O SCOPE: CPT

## 2022-03-05 PROCEDURE — 85025 COMPLETE CBC W/AUTO DIFF WBC: CPT

## 2022-03-05 PROCEDURE — 80053 COMPREHEN METABOLIC PANEL: CPT

## 2022-03-05 PROCEDURE — 99283 EMERGENCY DEPT VISIT LOW MDM: CPT

## 2022-03-05 PROCEDURE — 83735 ASSAY OF MAGNESIUM: CPT

## 2022-03-05 NOTE — ED PROVIDER NOTES
33-year-old female  with history of gestational hypertension and postpartum preeclampsia presents to the emergency department with chief complaint of headache. Patient states that she has chronic headaches but this headache has been consistent since yesterday. Patient denies changes vaginal bleeding,to vision, fever, difficulty speaking, changes to gait, unilateral weakness. Patient does endorse nausea, but she states that she currently has a prescription for Zofran for nausea. During her previous episode of gestational hypertension patient was put on blood pressure medications, but patient does not know what was. Patient also states she does have some right side abdominal pain started this morning. Classifies it as mild and intermittent. States she does have her appendix and her gallbladder. The history is provided by the patient. Headache  This is a new problem. The current episode started yesterday. The problem occurs constantly. The problem has not changed since onset. Associated symptoms include abdominal pain (Mild right-sided abdominal pain) and headaches. Pertinent negatives include no chest pain and no shortness of breath. Nothing aggravates the symptoms. Nothing relieves the symptoms. She has tried nothing for the symptoms.         Past Medical History:   Diagnosis Date    Anemia     Gestational hypertension     Gestational hypertension, third trimester 2020    Miscarriage     Preeclampsia in postpartum period        Past Surgical History:   Procedure Laterality Date    HX DILATION AND EVACUATION           Family History:   Problem Relation Age of Onset    Hypertension Mother        Social History     Socioeconomic History    Marital status:      Spouse name: Not on file    Number of children: Not on file    Years of education: Not on file    Highest education level: Not on file   Occupational History    Not on file   Tobacco Use    Smoking status: Never Smoker  Smokeless tobacco: Never Used   Vaping Use    Vaping Use: Never used   Substance and Sexual Activity    Alcohol use: Not Currently    Drug use: Not Currently    Sexual activity: Yes     Partners: Male     Birth control/protection: None   Other Topics Concern     Service Not Asked    Blood Transfusions Not Asked    Caffeine Concern Not Asked    Occupational Exposure Not Asked    Hobby Hazards Not Asked    Sleep Concern Not Asked    Stress Concern Not Asked    Weight Concern Not Asked    Special Diet Not Asked    Back Care Not Asked    Exercise Not Asked    Bike Helmet Not Asked    Seat Belt Not Asked    Self-Exams Not Asked   Social History Narrative    Not on file     Social Determinants of Health     Financial Resource Strain:     Difficulty of Paying Living Expenses: Not on file   Food Insecurity:     Worried About Running Out of Food in the Last Year: Not on file    Mathieu of Food in the Last Year: Not on file   Transportation Needs:     Lack of Transportation (Medical): Not on file    Lack of Transportation (Non-Medical):  Not on file   Physical Activity:     Days of Exercise per Week: Not on file    Minutes of Exercise per Session: Not on file   Stress:     Feeling of Stress : Not on file   Social Connections:     Frequency of Communication with Friends and Family: Not on file    Frequency of Social Gatherings with Friends and Family: Not on file    Attends Jehovah's witness Services: Not on file    Active Member of Clubs or Organizations: Not on file    Attends Club or Organization Meetings: Not on file    Marital Status: Not on file   Intimate Partner Violence:     Fear of Current or Ex-Partner: Not on file    Emotionally Abused: Not on file    Physically Abused: Not on file    Sexually Abused: Not on file   Housing Stability:     Unable to Pay for Housing in the Last Year: Not on file    Number of [place] in the Last Year: Not on file    Unstable Housing in the Last Year: Not on file         ALLERGIES: Patient has no known allergies. Review of Systems   Constitutional: Negative for chills and fever. Respiratory: Negative for shortness of breath. Cardiovascular: Negative for chest pain and palpitations. Gastrointestinal: Positive for abdominal pain (Mild right-sided abdominal pain), constipation and nausea. Negative for diarrhea and vomiting. Genitourinary: Negative for vaginal bleeding. Musculoskeletal: Negative for gait problem. Neurological: Positive for headaches. Negative for dizziness and light-headedness. All other systems reviewed and are negative. Vitals:    03/05/22 1800 03/05/22 1839   BP: (!) 162/97 (!) 159/98   Pulse: 97    Resp: 18    Temp: 98.5 °F (36.9 °C)    SpO2: 100%    Weight: 68 kg (150 lb)    Height: 5' 3\" (1.6 m)             Physical Exam  Vitals and nursing note reviewed. Constitutional:       General: She is not in acute distress. Appearance: Normal appearance. She is not ill-appearing, toxic-appearing or diaphoretic. HENT:      Head: Normocephalic and atraumatic. Nose: Nose normal.      Mouth/Throat:      Mouth: Mucous membranes are moist.   Eyes:      General: No visual field deficit or scleral icterus. Extraocular Movements: Extraocular movements intact. Conjunctiva/sclera: Conjunctivae normal.      Pupils: Pupils are equal, round, and reactive to light. Cardiovascular:      Rate and Rhythm: Normal rate. Pulses: Normal pulses. Heart sounds: Normal heart sounds. Pulmonary:      Effort: Pulmonary effort is normal.      Breath sounds: Normal breath sounds. Abdominal:      General: Bowel sounds are normal.      Tenderness: There is no abdominal tenderness. There is no right CVA tenderness, left CVA tenderness, guarding or rebound. Musculoskeletal:         General: Normal range of motion. Cervical back: Normal range of motion and neck supple. No rigidity or tenderness.       Right lower leg: No edema. Left lower leg: No edema. Lymphadenopathy:      Cervical: No cervical adenopathy. Skin:     General: Skin is warm and dry. Capillary Refill: Capillary refill takes less than 2 seconds. Coloration: Skin is not jaundiced or pale. Findings: No bruising, erythema, lesion or rash. Neurological:      General: No focal deficit present. Mental Status: She is alert and oriented to person, place, and time. GCS: GCS eye subscore is 4. GCS verbal subscore is 5. GCS motor subscore is 6. Cranial Nerves: Cranial nerves are intact. No cranial nerve deficit or facial asymmetry. Sensory: Sensation is intact. No sensory deficit. Motor: Motor function is intact. No weakness. Coordination: Coordination is intact. Romberg sign negative. Coordination normal. Finger-Nose-Finger Test and Heel to [facility] Test normal.      Gait: Gait normal.      Deep Tendon Reflexes: Reflexes normal.      Reflex Scores:       Tricep reflexes are 2+ on the right side and 2+ on the left side. Bicep reflexes are 2+ on the right side and 2+ on the left side. Patellar reflexes are 2+ on the right side and 2+ on the left side. Achilles reflexes are 2+ on the right side and 2+ on the left side. Psychiatric:         Mood and Affect: Mood normal.         Behavior: Behavior normal.         Thought Content: Thought content normal.         Judgment: Judgment normal.          MDM  Number of Diagnoses or Management Options  Gestational hypertension, second trimester  Diagnosis management comments: 51-year-old female with history of gestational hypertension and postpartum preeclampsia presented to emergency department with chief complaint of headache and high blood pressure. Patient states that she does have some intermittent right lower quadrant abdominal pain as well. She classifies the pain is mild.   Vital signs reviewed, patient stable, NAD    On physical exam patient is neurovascularly intact. Normal upper and lower extremity reflexes. Lungs clear to auscultation. No lower extremity edema. Tenderness to palpation over McBurney's point, no rebound tenderness in right lower quadrant, no tenderness to palpation of right lower quadrant. Fetal heart tones elevated with hand-held Doppler. Fetal heart rate 158. Rest of patient's physical exam is unremarkable. Urine negative for protein, CBC, CMP unremarkable. Magnesium normal.  Labs Reviewed  CBC WITH AUTOMATED DIFF - Abnormal; Notable for the following components:     HGB                           11.2 (*)               HCT                           34.7 (*)               RDW                           15.4 (*)               EOSINOPHILS                   0 (*)               All other components within normal limits  METABOLIC PANEL, COMPREHENSIVE - Abnormal; Notable for the following components:     Anion gap                     4 (*)                  Creatinine                    0.36 (*)               Albumin                       2.8 (*)                Globulin                      4.1 (*)                A-G Ratio                     0.7 (*)             All other components within normal limits  MAGNESIUM        Patient's blood pressure gradually decreased over time. Patient stated that her headache was dissipating. It is too early in patient's pregnancy for preeclampsia. Patient states that during her previous pregnancies she was placed on blood pressure medications but she does not know which    Consulted OB/GYN on-call for her OB/GYN office, Dr. Cristhian Wilder to give her an update on patient status and ask if she wanted any specific things done for management. Dr. Cristhian Wilder said patient can be discharged home with follow-up to the office on Monday. I discussed physical exam findings laboratory findings and conversation I had with on-call OB/GYN with the patient. Answered any questions that she had.     Discussed with patient the signs and symptoms that would warrant a prompt return to the emergency department. I included these signs and symptoms on patient's discharge paperwork. Pt Verbalized that theyunderstood. Patient discharged home in improved condition to follow-up with OB/GYN on Monday    Loretta Jones; 3/5/2022 @9:48 PM Voice dictation software was used during the making of this note. This software is not perfect and grammatical and other typographical errors may be present.   This note has not been proofread for errors.  ===================================================================         Amount and/or Complexity of Data Reviewed  Clinical lab tests: reviewed and ordered    Risk of Complications, Morbidity, and/or Mortality  Presenting problems: moderate  Diagnostic procedures: low  Management options: low    Patient Progress  Patient progress: improved    ED Course as of 03/05/22 2054   Sat Mar 05, 2022   2053 Consulted OBGYN on call  [JG]      ED Course User Index  [JG] Loretta Cole       Procedures

## 2022-03-05 NOTE — ED TRIAGE NOTES
Patient reports Headache today with some nausea. Patient is 14 weeks pregnant and has history preeclampsia, hyper tensive 162/97 in triage. Last took tylenol 1600.  Masked

## 2022-03-06 NOTE — DISCHARGE INSTRUCTIONS
Your evaluated emergency department today for high blood pressure and headache. Your lab work was all within the normal limits. There is no protein in your urine.   I spoke with on-call OB/GYN from your OB/GYN office and they said to follow-up on Monday in their office    Return to the emergency department if your blood pressure is elevated again, headache becomes worse, chest pains, shortness of breath, bleeding, swelling of the lower extremities

## 2022-03-06 NOTE — ED NOTES
I have reviewed discharge instructions with the patient. The patient verbalized understanding. Patient left ED via Discharge Method: ambulatory to Home with spouse. Opportunity for questions and clarification provided. Patient given 0 scripts. No e-sign. To continue your aftercare when you leave the hospital, you may receive an automated call from our care team to check in on how you are doing. This is a free service and part of our promise to provide the best care and service to meet your aftercare needs.  If you have questions, or wish to unsubscribe from this service please call [phone]. Thank you for Choosing our [facility] Emergency Department.

## 2022-03-18 PROBLEM — O09.90 HIGH-RISK PREGNANCY: Status: ACTIVE | Noted: 2022-01-25

## 2022-03-18 PROBLEM — Z92.29 COVID-19 VACCINE SERIES COMPLETED: Status: ACTIVE | Noted: 2022-01-25

## 2022-03-18 PROBLEM — U07.1 COVID-19: Status: ACTIVE | Noted: 2022-01-25

## 2022-03-19 PROBLEM — Z82.79 FAMILY HISTORY OF DOWN SYNDROME: Status: ACTIVE | Noted: 2020-06-18

## 2022-03-19 PROBLEM — Z87.59 HISTORY OF GESTATIONAL HYPERTENSION: Status: ACTIVE | Noted: 2022-01-25

## 2022-03-26 PROBLEM — O10.919 CHRONIC HYPERTENSION DURING PREGNANCY: Status: ACTIVE | Noted: 2022-01-25

## 2022-03-26 PROBLEM — Z87.59 HISTORY OF GESTATIONAL HYPERTENSION: Status: ACTIVE | Noted: 2022-03-26

## 2022-03-26 PROBLEM — U07.1 COVID-19 AFFECTING PREGNANCY IN FIRST TRIMESTER: Status: ACTIVE | Noted: 2022-01-25

## 2022-03-26 PROBLEM — O98.511 COVID-19 AFFECTING PREGNANCY IN FIRST TRIMESTER: Status: ACTIVE | Noted: 2022-01-25

## 2022-03-26 PROBLEM — O09.92 HIGH-RISK PREGNANCY IN SECOND TRIMESTER: Status: ACTIVE | Noted: 2022-01-25

## 2022-03-28 PROBLEM — Z87.59 HISTORY OF GESTATIONAL HYPERTENSION: Status: RESOLVED | Noted: 2022-03-26 | Resolved: 2022-03-28

## 2022-03-29 PROBLEM — O26.892 HEADACHE IN PREGNANCY, ANTEPARTUM, SECOND TRIMESTER: Status: ACTIVE | Noted: 2022-03-29

## 2022-03-29 PROBLEM — O09.292 HX OF PREECLAMPSIA, PRIOR PREGNANCY, CURRENTLY PREGNANT, SECOND TRIMESTER: Status: ACTIVE | Noted: 2022-03-29

## 2022-03-29 PROBLEM — R51.9 HEADACHE IN PREGNANCY, ANTEPARTUM, SECOND TRIMESTER: Status: ACTIVE | Noted: 2022-03-29

## 2022-06-13 ENCOUNTER — ROUTINE PRENATAL (OUTPATIENT)
Dept: OBGYN CLINIC | Age: 32
End: 2022-06-13
Payer: MEDICAID

## 2022-06-13 VITALS
SYSTOLIC BLOOD PRESSURE: 126 MMHG | HEIGHT: 63 IN | WEIGHT: 158 LBS | BODY MASS INDEX: 28 KG/M2 | DIASTOLIC BLOOD PRESSURE: 78 MMHG

## 2022-06-13 DIAGNOSIS — O09.93 SUPERVISION OF HIGH RISK PREGNANCY IN THIRD TRIMESTER: Primary | ICD-10-CM

## 2022-06-13 DIAGNOSIS — Z3A.28 28 WEEKS GESTATION OF PREGNANCY: ICD-10-CM

## 2022-06-13 DIAGNOSIS — Z13.89 SCREENING FOR GENITOURINARY CONDITION: ICD-10-CM

## 2022-06-13 LAB
GLUCOSE URINE, POC: NEGATIVE
PROTEIN,URINE, POC: NORMAL

## 2022-06-13 PROCEDURE — 99213 OFFICE O/P EST LOW 20 MIN: CPT | Performed by: OBSTETRICS & GYNECOLOGY

## 2022-06-13 PROCEDURE — 81002 URINALYSIS NONAUTO W/O SCOPE: CPT | Performed by: OBSTETRICS & GYNECOLOGY

## 2022-06-13 NOTE — PROGRESS NOTES
Will come back 6/16 for 1 hour GTT     C/o irregular cramping, headaches x4 days as of today has eased up with tylenol.  Pt reports that she has taken ibuprofen ok per MFM according to pt-she took this once this past week,nausea/vomiting in the mornings, has not taken aspirin this week-needs to buy more     Denies bleeding, spotting

## 2022-06-14 ENCOUNTER — ROUTINE PRENATAL (OUTPATIENT)
Dept: OBGYN CLINIC | Age: 32
End: 2022-06-14
Payer: MEDICAID

## 2022-06-14 VITALS — SYSTOLIC BLOOD PRESSURE: 137 MMHG | DIASTOLIC BLOOD PRESSURE: 92 MMHG | HEART RATE: 91 BPM

## 2022-06-14 DIAGNOSIS — Z3A.28 28 WEEKS GESTATION OF PREGNANCY: Primary | ICD-10-CM

## 2022-06-14 DIAGNOSIS — O98.511 COVID-19 AFFECTING PREGNANCY IN FIRST TRIMESTER: ICD-10-CM

## 2022-06-14 DIAGNOSIS — R51.9 HEADACHE IN PREGNANCY, ANTEPARTUM, THIRD TRIMESTER: ICD-10-CM

## 2022-06-14 DIAGNOSIS — O10.919 CHRONIC HYPERTENSION DURING PREGNANCY: ICD-10-CM

## 2022-06-14 DIAGNOSIS — O09.92 HIGH-RISK PREGNANCY IN SECOND TRIMESTER: ICD-10-CM

## 2022-06-14 DIAGNOSIS — Z82.79 FAMILY HISTORY OF DOWN SYNDROME: ICD-10-CM

## 2022-06-14 DIAGNOSIS — O09.292 HX OF PREECLAMPSIA, PRIOR PREGNANCY, CURRENTLY PREGNANT, SECOND TRIMESTER: ICD-10-CM

## 2022-06-14 DIAGNOSIS — O26.893 HEADACHE IN PREGNANCY, ANTEPARTUM, THIRD TRIMESTER: ICD-10-CM

## 2022-06-14 DIAGNOSIS — U07.1 COVID-19 AFFECTING PREGNANCY IN FIRST TRIMESTER: ICD-10-CM

## 2022-06-14 DIAGNOSIS — O26.899 DIARRHEA DURING PREGNANCY: ICD-10-CM

## 2022-06-14 DIAGNOSIS — R19.7 DIARRHEA DURING PREGNANCY: ICD-10-CM

## 2022-06-14 PROBLEM — Z92.29 COVID-19 VACCINE SERIES COMPLETED: Status: ACTIVE | Noted: 2022-01-25

## 2022-06-14 PROCEDURE — 99215 OFFICE O/P EST HI 40 MIN: CPT | Performed by: OBSTETRICS & GYNECOLOGY

## 2022-06-14 PROCEDURE — 76816 OB US FOLLOW-UP PER FETUS: CPT | Performed by: OBSTETRICS & GYNECOLOGY

## 2022-06-14 RX ORDER — IBUPROFEN 800 MG/1
800 TABLET ORAL PRN
COMMUNITY
End: 2022-07-13

## 2022-06-14 RX ORDER — LABETALOL 200 MG/1
200 TABLET, FILM COATED ORAL 3 TIMES DAILY
Qty: 90 TABLET | Refills: 3 | Status: ON HOLD | OUTPATIENT
Start: 2022-06-14 | End: 2022-08-30 | Stop reason: HOSPADM

## 2022-06-14 ASSESSMENT — PATIENT HEALTH QUESTIONNAIRE - PHQ9
SUM OF ALL RESPONSES TO PHQ QUESTIONS 1-9: 0
SUM OF ALL RESPONSES TO PHQ QUESTIONS 1-9: 0
1. LITTLE INTEREST OR PLEASURE IN DOING THINGS: 0
SUM OF ALL RESPONSES TO PHQ9 QUESTIONS 1 & 2: 0
SUM OF ALL RESPONSES TO PHQ QUESTIONS 1-9: 0
SUM OF ALL RESPONSES TO PHQ QUESTIONS 1-9: 0
2. FEELING DOWN, DEPRESSED OR HOPELESS: 0

## 2022-06-14 NOTE — PROGRESS NOTES
MFM Consultation    Anastasia Dancer (: 1990 ) is a 29 y.o. Nolia Inglis [phone] at 22w1d with 2022, by Ultrasound. 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       Patient has had some significant HAs, some visual disturbances, no edema, checks BP at home daily.  Reports good fetal movement.  No bleeding, LOF, cramping, ctxs, or vaginal pressure. Global, not c/w stereotypical migraines. No Hx migraines.      Interval history reviewed. Review of Systems - per HPI; otherwise unremarkable. Exam:     Vitals:    22 0938   BP: (!) 137/92   Pulse: 91       Ultrasound: Please see formal ultrasound report under imaging tab. ASSESSMENT/PLAN:  Patient Active Problem List    Diagnosis Date Noted    High-risk pregnancy in second trimester 2022     Priority: High     Genetics- NIPT negative x 3  Glucola-  Flu-declines (22)  Tdap-  SBIRT done at [facility] talk   3/29/2022 at [facility]: Normal early anatomy, AC: 31%, SYED WNL. 5/3/2022 at [facility]: Normal anatomy and echo; AC 43%, Overall 44%, SYED 14 cm, CL 3.8 cm.    2022 at [facility]: Normal anatomy and echo; AC 56%, Overall 60%, SYED 14.5cm (DVP  3.9), CL 3.5cm. · F/U in 4 weeks at [facility] for fetal growth due to Chronic HTN.  Chronic hypertension during pregnancy 2022     Priority: High     GHTN and pp preeclampsia with G3  Start ASA 81mg x2 and vitamin D 2000iu at 12 weeks. D/c asa at 36 weeks   3/7/22 seen in office for ER follow up with elevated BP.  Taking Labetalol   200mg BID Refer to [facility] per Dr. Brittany Mendez.   3/22/22 PreE labs WNL, UA 2.9, 24 hr urine 191.  3/29/2022 [facility]: Taking labetalol 200mg QDAY.  5/3/2022 at [facility]: Taking Labetalol 200mg bid.    22 at [facility]: BP mildly elevated today (137/92), patient did not take her morning Labetalol dose. Increase labetalol to TID, prescription sent.  Diarrhea during pregnancy 06/14/2022     Priority: Medium     06/14/22 at [facility]: c/o diarrhea. Had Chronic Diarrhea in last pregnancy in 3rd trimester. Nonpainful, no blood. · Instructed to take 2 peptobismol caplets daily and immodium as needed.  Hx of preeclampsia, prior pregnancy, currently pregnant, second trimester 03/29/2022     Priority: Low     3/29/2022 [facility]: 37wk; mild range BP. Normotensive by 6wk pp.   3/29/2022 at [facility]: BP normal today. 03/24/22 24 hour urine crea clear 194, total protein 165        Headache in pregnancy, antepartum, third trimester 03/29/2022     Priority: Low     3/29/2022 [facility]: bilateral frontal/parietal. No hx migraines. Initially was   noted to coincide with HTN, now normotensive on meds and not coinciding   with BP swings. Sx'c care reviewed. Given via [address]  06/14/22 at [facility]: no change to headaches, stable.  COVID-19 vaccine series completed 01/25/2022     Priority: Low     2nd COVID vaccine, 09/2021         COVID-19 affecting pregnancy in first trimester 01/25/2022     Priority: Low     Tested positive 12/27- early first trimester  Vax x2  3/29/2022 at [facility]: no residual symptoms.  No sonographic findings c/w   placentitis  · No further testing needed        Family history of Down syndrome 06/18/2020     Priority: Low     FOB 1st Maternal Cousin  NIPT will be drawn at NOB exam            Addressed normal pregnancy complaints, reassured and offered suggestions for care  Reviewed gestational age precautions and activity goals/limitations  Nutritional counseling as well as specific goals based on current maternal and fetal status  Options for GERD therapy if becomes symptomatic over course of pregnancy  Gestational age appropriate preventive care regarding communicable disease transmission and vaccines as appropriate (including flu, TDaP, and COVID.)  Additional plans and concerns as documented in problem list.   All questions answered and concerns discussed. An electronic signature was used to authenticate this note.   Brynn Senior MD    I have spent 40 minutes reviewing previous notes, test results and face to face with the patient discussing the diagnosis and importance of compliance with the treatment plan, in addition to ultrasound findings, as well as documenting on the day of the visit (6/14/2022)    Return in 4 weeks, Growth and BPP      Patient Active Problem List   Diagnosis Code    COVID-19 vaccine series completed Z92.29    Family history of Down syndrome Z82.79    COVID-19 affecting pregnancy in first trimester O98.511, U5.3    High-risk pregnancy in second trimester O09.92    Chronic hypertension during pregnancy O10.919    Hx of preeclampsia, prior pregnancy, currently pregnant, second trimester O09.292    Headache in pregnancy, antepartum, third trimester O26.893, R51.9    Diarrhea during pregnancy O26.899, R19.7

## 2022-06-16 ENCOUNTER — NURSE ONLY (OUTPATIENT)
Dept: OBGYN CLINIC | Age: 32
End: 2022-06-16

## 2022-06-16 DIAGNOSIS — Z13.1 SCREENING FOR DIABETES MELLITUS: Primary | ICD-10-CM

## 2022-06-16 DIAGNOSIS — Z13.0 SCREENING FOR IRON DEFICIENCY ANEMIA: ICD-10-CM

## 2022-06-17 LAB
GLUCOSE 1 HOUR: 113 MG/DL
HGB BLD-MCNC: 8.9 G/DL (ref 11.7–15.4)

## 2022-06-20 ENCOUNTER — TELEPHONE (OUTPATIENT)
Dept: OBGYN CLINIC | Age: 32
End: 2022-06-20

## 2022-06-20 DIAGNOSIS — O99.013 ANEMIA IN PREGNANCY, THIRD TRIMESTER: Primary | ICD-10-CM

## 2022-06-20 NOTE — TELEPHONE ENCOUNTER
Called patient to discuss recent lab results. No answer, LVM to return call. Will also send my chart message.     Orders Placed This Encounter   Procedures   Rosaura Velásquez MD, Hematology & Oncology, [place]     Referral Priority:   Routine     Referral Type:   Eval and Treat     Referral Reason:   Specialty Services Required     Referred to Provider:   Jayce Nowak MD     Requested Specialty:   Pediatric Hematology/Oncology     Number of Visits Requested:   1

## 2022-06-20 NOTE — TELEPHONE ENCOUNTER
----- Message from Megan Bedoya DO sent at 6/18/2022  1:04 AM EDT -----  Passed glucola. Hg is low.   Oral iron and refer to hematology for iv iron

## 2022-06-28 ENCOUNTER — TELEPHONE (OUTPATIENT)
Dept: OBGYN CLINIC | Age: 32
End: 2022-06-28

## 2022-06-28 NOTE — TELEPHONE ENCOUNTER
Pt had 1 hour gtt and hgb drawn on 06/16/22. The specimens were left out in the lock box, outside overnight in extreme heat. It has been advised that her labs be recollected to ensure accuracy in the tests. Her labs have already been resulted, this patient should be not be charged for the labs drawn on 06/16.      Called pt, Left message for pt to call the office

## 2022-06-29 NOTE — TELEPHONE ENCOUNTER
Pt calls back, she is aware of the problem with her specimen from 06/16. She is going to come back on Friday to have 1 hour gtt and hgb drawn. All questions answered. She is aware that she should not get a bill for 06/16 labs. She is asked to call me back if she does. Voiced full understanding.

## 2022-07-01 ENCOUNTER — NURSE ONLY (OUTPATIENT)
Dept: OBGYN CLINIC | Age: 32
End: 2022-07-01

## 2022-07-01 DIAGNOSIS — Z13.1 SCREENING FOR DIABETES MELLITUS: Primary | ICD-10-CM

## 2022-07-01 DIAGNOSIS — Z13.0 SCREENING FOR IRON DEFICIENCY ANEMIA: ICD-10-CM

## 2022-07-01 LAB
GLUCOSE 1 HOUR: 174 MG/DL
HGB BLD-MCNC: 9.3 G/DL (ref 11.7–15.4)

## 2022-07-06 ENCOUNTER — ROUTINE PRENATAL (OUTPATIENT)
Dept: OBGYN CLINIC | Age: 32
End: 2022-07-06
Payer: MEDICAID

## 2022-07-06 VITALS
BODY MASS INDEX: 29.23 KG/M2 | DIASTOLIC BLOOD PRESSURE: 74 MMHG | HEIGHT: 63 IN | WEIGHT: 165 LBS | SYSTOLIC BLOOD PRESSURE: 116 MMHG

## 2022-07-06 DIAGNOSIS — Z3A.31 31 WEEKS GESTATION OF PREGNANCY: ICD-10-CM

## 2022-07-06 DIAGNOSIS — O09.93 HIGH-RISK PREGNANCY IN THIRD TRIMESTER: Primary | ICD-10-CM

## 2022-07-06 DIAGNOSIS — O09.92 HIGH-RISK PREGNANCY IN SECOND TRIMESTER: ICD-10-CM

## 2022-07-06 DIAGNOSIS — Z13.89 SCREENING FOR GENITOURINARY CONDITION: ICD-10-CM

## 2022-07-06 DIAGNOSIS — O99.013 ANEMIA IN PREGNANCY, THIRD TRIMESTER: ICD-10-CM

## 2022-07-06 LAB
GLUCOSE URINE, POC: NORMAL
PROTEIN,URINE, POC: NORMAL

## 2022-07-06 PROCEDURE — 81002 URINALYSIS NONAUTO W/O SCOPE: CPT | Performed by: OBSTETRICS & GYNECOLOGY

## 2022-07-06 PROCEDURE — 99213 OFFICE O/P EST LOW 20 MIN: CPT | Performed by: OBSTETRICS & GYNECOLOGY

## 2022-07-06 NOTE — PROGRESS NOTES
Needs 3 hour GTT, 174 (7/1/22)-scheduled for tomorrow, 7/7/22  Hemoglobin 9.3 (referred to hematology)    C/o fatigue, cramping, would like to know if she should starting iron supplement?     Denies bleeding, spotting, headaches, n/v

## 2022-07-07 ENCOUNTER — NURSE ONLY (OUTPATIENT)
Dept: OBGYN CLINIC | Age: 32
End: 2022-07-07

## 2022-07-07 DIAGNOSIS — Z13.1 SCREENING FOR DIABETES MELLITUS: Primary | ICD-10-CM

## 2022-07-07 LAB
GESTATIONAL 3HR GTT: NORMAL
GLUCOSE 1H P 100 G GLC PO SERPL-MCNC: 181 MG/DL
GLUCOSE 2 HOUR: 151 MG/DL
GLUCOSE P FAST SERPL-MCNC: 90 MG/DL
GLUCOSE, 3 HOUR: 147 MG/DL

## 2022-07-08 ENCOUNTER — TELEPHONE (OUTPATIENT)
Dept: OBGYN CLINIC | Age: 32
End: 2022-07-08

## 2022-07-08 DIAGNOSIS — O24.419 GESTATIONAL DIABETES MELLITUS (GDM) IN THIRD TRIMESTER, GESTATIONAL DIABETES METHOD OF CONTROL UNSPECIFIED: ICD-10-CM

## 2022-07-08 DIAGNOSIS — Z34.83 PRENATAL CARE, SUBSEQUENT PREGNANCY, THIRD TRIMESTER: Primary | ICD-10-CM

## 2022-07-08 DIAGNOSIS — O99.013 ANEMIA IN PREGNANCY, THIRD TRIMESTER: Primary | ICD-10-CM

## 2022-07-08 DIAGNOSIS — Z3A.31 31 WEEKS GESTATION OF PREGNANCY: ICD-10-CM

## 2022-07-08 RX ORDER — LANCETS 30 GAUGE
1 EACH MISCELLANEOUS DAILY
Qty: 150 EACH | Refills: 5 | Status: SHIPPED | OUTPATIENT
Start: 2022-07-08 | End: 2022-09-02

## 2022-07-08 RX ORDER — GLUCOSAMINE HCL/CHONDROITIN SU 500-400 MG
CAPSULE ORAL
Qty: 150 STRIP | Refills: 5 | Status: SHIPPED | OUTPATIENT
Start: 2022-07-08 | End: 2022-09-02

## 2022-07-08 NOTE — TELEPHONE ENCOUNTER
----- Message from Brian Garcia MD sent at 7/8/2022  8:48 AM EDT -----  No parameters listed- ??   Please verify

## 2022-07-08 NOTE — TELEPHONE ENCOUNTER
Pt called back with questions related to GDM dx. Pt wanted to make sure she did not need to get a specific glucometer. Also, wanted to know if elevated readings what to do. Advised to avoid carbs/sugars. She is to take readings with her to [facility] appt next week. Patient informed to watch for certain foods that could be causing elevations and then avoid. If any questions she was encouraged to reach out to our office. All questions answered. She v/u.

## 2022-07-11 ENCOUNTER — FOLLOWUP TELEPHONE ENCOUNTER (OUTPATIENT)
Dept: DIABETES SERVICES | Age: 32
End: 2022-07-11

## 2022-07-11 ENCOUNTER — CLINICAL DOCUMENTATION (OUTPATIENT)
Dept: OBGYN CLINIC | Age: 32
End: 2022-07-11

## 2022-07-11 NOTE — TELEPHONE ENCOUNTER
Second attempt to contact pt regarding gestational diabetes education. Left message asking for a return call.

## 2022-07-11 NOTE — PROGRESS NOTES
New Patient Abstract    Reason for Referral: Anemia in pregnancy, third trimester    Referring Provider:  Ion Castaneda DO    Primary Care Provider: None on file    Family History of Cancer/Hematologic Disorders: None noted    Presenting Symptoms: nausea and headaches    Narrative with recent with Results/Procedures/Biopsies and Dates completed:   Ms. Hermelinda Tang is a 82-year-old  female who reports to have never used tobacco products. She reports alcohol and drug substance use as not currently. Her medical history reports as anemia, pregnancy HTN, eclampsia, gestational diabetes, miscarriage, preeclampsia and pre-term delivery (2020). Her surgical history reports as D&C. Ms Hermelinda Tang is 32 weeks and 1 day pregnant with an estimated date of delivery of 9/5/2022. In June 2022 her hemoglobin reported decreased at 8.9. She was to start oral iron and referral was placed to hematology for consideration of iron infusions. Her 7/1/22 hemoglobin reported at 9.3. Her pre-term delivery in 2020 reported a low hemoglobin of 6.6 on 12/30/2020. It is unclear if she required a blood transfusion with her 2020 delivery.     Labs   12/30/2020 16:36 1/7/2021 11:37 1/7/2021 23:12 1/25/2022 08:46 3/5/2022 20:09 3/22/2022 15:45 6/17/2022 18:52 7/1/2022 15:46   WBC  7.3 10.1 6.6 7.4 7.4     RBC  3.59 (L) 3.53 (L) 4.26 4.13 3.94     Hemoglobin Quant 7.2 (L) 8.6 (L) 8.4 (L) 12.2 11.2 (L) 10.8 (L) 8.9 (L) 9.3 (L)   Hematocrit 23.9 (L) 27.5 (L) 28.6 (L) 36.7 34.7 (L) 33.8 (L)     MCV  77 (L) 81.0 86 84.0 86     MCH  24.0 (L) 23.8 (L) 28.6 27.1 27.4     MCHC  31.3 (L) 29.4 (L) 33.2 32.3 32.0     MPV   9.9  11.4      RDW  17.0 (H) 17.9 (H) 14.4 15.4 (H) 15.4     Platelet Count  182 089 303 241 258     Neutrophils %   53 45 44 54     Lymphocyte %   33 43 43 29     Monocytes %   10 10 12 14     Eosinophils %   2 0 0 (L) 1     Basophils %   1 1 0 1     Neutrophils Absolute   5.4 3.0 3.3 4.1     Lymphocytes Absolute   3.4 2.9 3.2 2.1 Monocytes Absolute   1.0 0.7 0.9 1.0 (H)     Eosinophils Absolute   0.2 0.0 0.0 0.1     Basophils Absolute   0.1 0.0 0.0 0.0     Differential Type   AUTOMATED  AUTOMATED      GRANULOCYTE ABSOLUTE COUNT   0.2 0.0 0.0 0.1     Immature Granulocytes   2 1 1 1     NRBC Absolute   0.00  0.00         1/7/2021 23:12 3/5/2022 20:09 3/22/2022 15:45   Sodium 139 136 139   Potassium 3.5 4.0 3.8   Chloride 107 107 105   CO2 25 25 19 (L)   BUN 17 6 8   Creatinine 0.61 0.36 (L) 0.39 (L)   Bun/Cre Ratio   21   Anion Gap 7 4 (L)    EGFR IF NonAfrican American >60 >60    GFR African American >60 >60    Magnesium  2.0    GLUCOSE 87 91 95   CALCIUM 9.0 8.4 9.4   Total Protein 7.2 6.9 6.5   Uric Acid, Serum 3.9  2.9    (H)  147   Albumin 3.0 (L) 2.8 (L) 3.5 (L)   Globulin 4.2 (H) 4.1 (H)    Albumin/Globulin Ratio 0.7 (L) 0.7 (L) 1.2   Alk Phos 135 57 54   ALT 23 17 11   AST 11 (L) 19 11   Bilirubin 0.2 0.2 <0.2     Notes from Referring Provider: Consult for IV infusions.     Other Pertinent Information: No iron studies available    Presented at Tumor Board: n/a

## 2022-07-12 ENCOUNTER — OFFICE VISIT (OUTPATIENT)
Dept: ONCOLOGY | Age: 32
End: 2022-07-12
Payer: MEDICAID

## 2022-07-12 ENCOUNTER — HOSPITAL ENCOUNTER (OUTPATIENT)
Dept: LAB | Age: 32
Discharge: HOME | End: 2022-07-15
Payer: MEDICAID

## 2022-07-12 ENCOUNTER — FOLLOWUP TELEPHONE ENCOUNTER (OUTPATIENT)
Dept: DIABETES SERVICES | Age: 32
End: 2022-07-12

## 2022-07-12 VITALS
OXYGEN SATURATION: 100 % | HEIGHT: 63 IN | RESPIRATION RATE: 24 BRPM | DIASTOLIC BLOOD PRESSURE: 81 MMHG | BODY MASS INDEX: 28.56 KG/M2 | TEMPERATURE: 98.6 F | SYSTOLIC BLOOD PRESSURE: 113 MMHG | HEART RATE: 98 BPM | WEIGHT: 161.2 LBS

## 2022-07-12 DIAGNOSIS — E61.1 IRON DEFICIENCY: Primary | ICD-10-CM

## 2022-07-12 DIAGNOSIS — O99.013 ANEMIA IN PREGNANCY, THIRD TRIMESTER: ICD-10-CM

## 2022-07-12 LAB
ALBUMIN SERPL-MCNC: 2.7 G/DL (ref 3.5–5)
ALBUMIN/GLOB SERPL: 0.6 {RATIO} (ref 1.2–3.5)
ALP SERPL-CCNC: 113 U/L (ref 50–136)
ALT SERPL-CCNC: 16 U/L (ref 12–65)
ANION GAP SERPL CALC-SCNC: 9 MMOL/L (ref 7–16)
APPEARANCE UR: CLEAR
AST SERPL-CCNC: 14 U/L (ref 15–37)
BACTERIA URNS QL MICRO: NORMAL /HPF
BASOPHILS # BLD: 0 K/UL (ref 0–0.2)
BASOPHILS NFR BLD: 1 % (ref 0–2)
BILIRUB SERPL-MCNC: 0.2 MG/DL (ref 0.2–1.1)
BILIRUB UR QL: NEGATIVE
BUN SERPL-MCNC: 8 MG/DL (ref 6–23)
CALCIUM SERPL-MCNC: 9.1 MG/DL (ref 8.3–10.4)
CASTS URNS QL MICRO: 0 /LPF
CHLORIDE SERPL-SCNC: 107 MMOL/L (ref 98–107)
CO2 SERPL-SCNC: 24 MMOL/L (ref 21–32)
COLOR UR: YELLOW
CREAT SERPL-MCNC: 0.5 MG/DL (ref 0.6–1)
CRYSTALS URNS QL MICRO: 0 /LPF
DAT POLY-SP REAG RBC QL: NORMAL
DIFFERENTIAL METHOD BLD: ABNORMAL
EOSINOPHIL # BLD: 0 K/UL (ref 0–0.8)
EOSINOPHIL NFR BLD: 0 % (ref 0.5–7.8)
EPI CELLS #/AREA URNS HPF: NORMAL /HPF
ERYTHROCYTE [DISTWIDTH] IN BLOOD BY AUTOMATED COUNT: 18.1 % (ref 11.9–14.6)
ERYTHROCYTE [SEDIMENTATION RATE] IN BLOOD: 37 MM/HR (ref 0–20)
FERRITIN SERPL-MCNC: 3 NG/ML (ref 8–388)
FOLATE SERPL-MCNC: 13.4 NG/ML (ref 3.1–17.5)
GLOBULIN SER CALC-MCNC: 4.4 G/DL (ref 2.3–3.5)
GLUCOSE SERPL-MCNC: 107 MG/DL (ref 65–100)
GLUCOSE UR STRIP.AUTO-MCNC: NEGATIVE MG/DL
HCT VFR BLD AUTO: 29.7 % (ref 35.8–46.3)
HGB BLD-MCNC: 8.7 G/DL (ref 11.7–15.4)
HGB RETIC QN AUTO: 21 PG (ref 29–35)
HGB UR QL STRIP: ABNORMAL
IMM GRANULOCYTES # BLD AUTO: 0.2 K/UL (ref 0–0.5)
IMM GRANULOCYTES NFR BLD AUTO: 3 % (ref 0–5)
IMM RETICS NFR: 35.2 % (ref 3–15.9)
IRON SATN MFR SERPL: 5 %
IRON SERPL-MCNC: 27 UG/DL (ref 35–150)
KETONES UR QL STRIP.AUTO: NEGATIVE MG/DL
LDH SERPL L TO P-CCNC: 140 U/L (ref 100–190)
LEUKOCYTE ESTERASE UR QL STRIP.AUTO: NEGATIVE
LYMPHOCYTES # BLD: 2.6 K/UL (ref 0.5–4.6)
LYMPHOCYTES NFR BLD: 32 % (ref 13–44)
MCH RBC QN AUTO: 23.3 PG (ref 26.1–32.9)
MCHC RBC AUTO-ENTMCNC: 29.3 G/DL (ref 31.4–35)
MCV RBC AUTO: 79.6 FL (ref 79.6–97.8)
MONOCYTES # BLD: 0.6 K/UL (ref 0.1–1.3)
MONOCYTES NFR BLD: 8 % (ref 4–12)
MUCOUS THREADS URNS QL MICRO: NORMAL /LPF
NEUTS SEG # BLD: 4.6 K/UL (ref 1.7–8.2)
NEUTS SEG NFR BLD: 56 % (ref 43–78)
NITRITE UR QL STRIP.AUTO: NEGATIVE
NRBC # BLD: 0 K/UL (ref 0–0.2)
PH UR STRIP: 6.5 [PH] (ref 5–9)
PHOSPHATE SERPL-MCNC: 3.5 MG/DL (ref 2.5–4.5)
PLATELET # BLD AUTO: 276 K/UL (ref 150–450)
PMV BLD AUTO: 10.2 FL (ref 9.4–12.3)
POTASSIUM SERPL-SCNC: 3.6 MMOL/L (ref 3.5–5.1)
PROT SERPL-MCNC: 7.1 G/DL (ref 6.3–8.2)
PROT UR STRIP-MCNC: NEGATIVE MG/DL
RBC # BLD AUTO: 3.73 M/UL (ref 4.05–5.2)
RBC #/AREA URNS HPF: NORMAL /HPF
RETICS # AUTO: 0.09 M/UL (ref 0.03–0.1)
RETICS/RBC NFR AUTO: 2.4 % (ref 0.3–2)
SODIUM SERPL-SCNC: 140 MMOL/L (ref 136–145)
SP GR UR REFRACTOMETRY: 1.02 (ref 1–1.02)
T4 FREE SERPL-MCNC: 0.7 NG/DL (ref 0.78–1.4)
TIBC SERPL-MCNC: 586 UG/DL (ref 250–450)
TSH, 3RD GENERATION: 1.42 UIU/ML (ref 0.36–3)
URATE SERPL-MCNC: 3.2 MG/DL (ref 2.6–6)
UROBILINOGEN UR QL STRIP.AUTO: 0.2 EU/DL (ref 0.2–1)
VIT B12 SERPL-MCNC: 253 PG/ML (ref 193–986)
WBC # BLD AUTO: 8.1 K/UL (ref 4.3–11.1)
WBC URNS QL MICRO: NORMAL /HPF

## 2022-07-12 PROCEDURE — 86880 COOMBS TEST DIRECT: CPT

## 2022-07-12 PROCEDURE — 83615 LACTATE (LD) (LDH) ENZYME: CPT

## 2022-07-12 PROCEDURE — 81015 MICROSCOPIC EXAM OF URINE: CPT

## 2022-07-12 PROCEDURE — 99205 OFFICE O/P NEW HI 60 MIN: CPT | Performed by: PEDIATRICS

## 2022-07-12 PROCEDURE — 82728 ASSAY OF FERRITIN: CPT

## 2022-07-12 PROCEDURE — 84439 ASSAY OF FREE THYROXINE: CPT

## 2022-07-12 PROCEDURE — 84443 ASSAY THYROID STIM HORMONE: CPT

## 2022-07-12 PROCEDURE — 82607 VITAMIN B-12: CPT

## 2022-07-12 PROCEDURE — 85025 COMPLETE CBC W/AUTO DIFF WBC: CPT

## 2022-07-12 PROCEDURE — 82746 ASSAY OF FOLIC ACID SERUM: CPT

## 2022-07-12 PROCEDURE — 85652 RBC SED RATE AUTOMATED: CPT

## 2022-07-12 PROCEDURE — 84550 ASSAY OF BLOOD/URIC ACID: CPT

## 2022-07-12 PROCEDURE — 84100 ASSAY OF PHOSPHORUS: CPT

## 2022-07-12 PROCEDURE — 81003 URINALYSIS AUTO W/O SCOPE: CPT

## 2022-07-12 PROCEDURE — 83540 ASSAY OF IRON: CPT

## 2022-07-12 PROCEDURE — 85046 RETICYTE/HGB CONCENTRATE: CPT

## 2022-07-12 PROCEDURE — 86038 ANTINUCLEAR ANTIBODIES: CPT

## 2022-07-12 PROCEDURE — 36415 COLL VENOUS BLD VENIPUNCTURE: CPT

## 2022-07-12 PROCEDURE — 84238 ASSAY NONENDOCRINE RECEPTOR: CPT

## 2022-07-12 PROCEDURE — 80053 COMPREHEN METABOLIC PANEL: CPT

## 2022-07-12 RX ORDER — HEPARIN SODIUM (PORCINE) LOCK FLUSH IV SOLN 100 UNIT/ML 100 UNIT/ML
500 SOLUTION INTRAVENOUS PRN
Status: CANCELLED | OUTPATIENT
Start: 2022-07-19

## 2022-07-12 RX ORDER — ACETAMINOPHEN 325 MG/1
650 TABLET ORAL
Status: CANCELLED | OUTPATIENT
Start: 2022-07-19

## 2022-07-12 RX ORDER — ONDANSETRON 2 MG/ML
8 INJECTION INTRAMUSCULAR; INTRAVENOUS
Status: CANCELLED | OUTPATIENT
Start: 2022-07-19

## 2022-07-12 RX ORDER — ACETAMINOPHEN 500 MG
1000 TABLET ORAL EVERY 6 HOURS PRN
COMMUNITY
End: 2022-09-12

## 2022-07-12 RX ORDER — MEPERIDINE HYDROCHLORIDE 50 MG/ML
12.5 INJECTION INTRAMUSCULAR; INTRAVENOUS; SUBCUTANEOUS PRN
Status: CANCELLED | OUTPATIENT
Start: 2022-07-19

## 2022-07-12 RX ORDER — FAMOTIDINE 10 MG/ML
20 INJECTION, SOLUTION INTRAVENOUS
Status: CANCELLED | OUTPATIENT
Start: 2022-07-19

## 2022-07-12 RX ORDER — ALBUTEROL SULFATE 90 UG/1
4 AEROSOL, METERED RESPIRATORY (INHALATION) PRN
Status: CANCELLED | OUTPATIENT
Start: 2022-07-19

## 2022-07-12 RX ORDER — SODIUM CHLORIDE 0.9 % (FLUSH) 0.9 %
5-40 SYRINGE (ML) INJECTION PRN
Status: CANCELLED | OUTPATIENT
Start: 2022-07-19

## 2022-07-12 RX ORDER — SODIUM CHLORIDE 9 MG/ML
5-250 INJECTION, SOLUTION INTRAVENOUS PRN
Status: CANCELLED | OUTPATIENT
Start: 2022-07-19

## 2022-07-12 RX ORDER — DIPHENHYDRAMINE HYDROCHLORIDE 50 MG/ML
50 INJECTION INTRAMUSCULAR; INTRAVENOUS
Status: CANCELLED | OUTPATIENT
Start: 2022-07-19

## 2022-07-12 RX ORDER — ACETAMINOPHEN 325 MG/1
650 TABLET ORAL ONCE
Status: CANCELLED | OUTPATIENT
Start: 2022-07-19 | End: 2022-07-19

## 2022-07-12 RX ORDER — SODIUM CHLORIDE 9 MG/ML
INJECTION, SOLUTION INTRAVENOUS CONTINUOUS
Status: CANCELLED | OUTPATIENT
Start: 2022-07-19

## 2022-07-12 RX ORDER — DIPHENHYDRAMINE HYDROCHLORIDE 50 MG/ML
25 INJECTION INTRAMUSCULAR; INTRAVENOUS ONCE
Status: CANCELLED | OUTPATIENT
Start: 2022-07-19 | End: 2022-07-19

## 2022-07-12 RX ORDER — EPINEPHRINE 1 MG/ML
0.3 INJECTION, SOLUTION, CONCENTRATE INTRAVENOUS PRN
Status: CANCELLED | OUTPATIENT
Start: 2022-07-19

## 2022-07-12 NOTE — PROGRESS NOTES
HISTORY OF PRESENT ILLNESS  radha is a 29 y.o. y.o. female with anemia  ABSTRACT  New Patient Abstract    Reason for Referral: Anemia in pregnancy, third trimester    Referring Provider:  Joya Ceballos DO    Primary Care Provider: None on file    Family History of Cancer/Hematologic Disorders: None noted    Presenting Symptoms: nausea and headaches    Narrative with recent with Results/Procedures/Biopsies and Dates completed:   Ms. Diego Cortez is a 82-year-old  female who reports to have never used tobacco products. She reports alcohol and drug substance use as not currently. Her medical history reports as anemia, pregnancy HTN, eclampsia, gestational diabetes, miscarriage, preeclampsia and pre-term delivery (2020). Her surgical history reports as D&C. Ms Diego Cortez is 32 weeks and 1 day pregnant with an estimated date of delivery of 9/5/2022. In June 2022 her hemoglobin reported decreased at 8.9. She was to start oral iron and referral was placed to hematology for consideration of iron infusions. Her 7/1/22 hemoglobin reported at 9.3. Her pre-term delivery in 2020 reported a low hemoglobin of 6.6 on 12/30/2020. It is unclear if she required a blood transfusion with her 2020 delivery.     Labs   12/30/2020 16:36 1/7/2021 11:37 1/7/2021 23:12 1/25/2022 08:46 3/5/2022 20:09 3/22/2022 15:45 6/17/2022 18:52 7/1/2022 15:46   WBC  7.3 10.1 6.6 7.4 7.4     RBC  3.59 (L) 3.53 (L) 4.26 4.13 3.94     Hemoglobin Quant 7.2 (L) 8.6 (L) 8.4 (L) 12.2 11.2 (L) 10.8 (L) 8.9 (L) 9.3 (L)   Hematocrit 23.9 (L) 27.5 (L) 28.6 (L) 36.7 34.7 (L) 33.8 (L)     MCV  77 (L) 81.0 86 84.0 86     MCH  24.0 (L) 23.8 (L) 28.6 27.1 27.4     MCHC  31.3 (L) 29.4 (L) 33.2 32.3 32.0     MPV   9.9  11.4      RDW  17.0 (H) 17.9 (H) 14.4 15.4 (H) 15.4     Platelet Count  530 222 303 241 258     Neutrophils %   53 45 44 54     Lymphocyte %   33 43 43 29     Monocytes %   10 10 12 14     Eosinophils %   2 0 0 (L) 1     Basophils %   1 1 0 1 acetaminophen (TYLENOL) 500 MG tablet Take 1,000 mg by mouth every 6 hours as needed    blood glucose monitor strips Use to check blood sugar readings 4 times daily    blood glucose monitor kit and supplies Please supply patient with a glucose monitor that is covered by insurance that can be used to check blood sugar readings 4 times daily    Lancets MISC 1 each by Does not apply route daily Use to check blood sugar readings 4 times daily    labetalol (NORMODYNE) 200 MG tablet Take 1 tablet by mouth 3 times daily    Prenat w/o I-OB-Mamfnig-FA-DHA (PNV-DHA PO) Take by mouth    aspirin 81 MG chewable tablet Take 162 mg by mouth daily     Cholecalciferol 50 MCG (2000 UT) CAPS Take 2,000 Units by mouth daily    ibuprofen (ADVIL;MOTRIN) 800 MG tablet Take 800 mg by mouth as needed for Pain Only take once every few days for severe headache. (Patient not taking: Reported on 2022)    ondansetron (ZOFRAN-ODT) 4 MG disintegrating tablet Take 4 mg by mouth every 8 hours as needed  (Patient not taking: Reported on 2022)     No current facility-administered medications for this visit.        Past Medical History:   Diagnosis Date    Anemia     Chronic hypertension during pregnancy     Eclampsia     Gestational diabetes 2022    Gestational hypertension     Gestational hypertension, third trimester 2020    Hypertension     Miscarriage     Preeclampsia     2020 pregnancy    Preeclampsia in postpartum period      delivery        Past Surgical History:   Procedure Laterality Date    DILATION AND EVACUATION OF UTERUS         Family History   Problem Relation Age of Onset    Hypertension Mother     Cancer Maternal Grandmother         brain        Social History     Tobacco Use    Smoking status: Never Smoker    Smokeless tobacco: Never Used   Substance Use Topics    Alcohol use: Not Currently    Drug use: Not Currently       Immunization History   Administered Date(s) Administered  Chloride 07/12/2022 107  98 - 107 mmol/L Final    CO2 07/12/2022 24  21 - 32 mmol/L Final    Anion Gap 07/12/2022 9  7 - 16 mmol/L Final    Glucose 07/12/2022 107* 65 - 100 mg/dL Final    BUN 07/12/2022 8  6 - 23 MG/DL Final    CREATININE 07/12/2022 0.50* 0.6 - 1.0 MG/DL Final    GFR  07/12/2022 >60  >60 ml/min/1.73m2 Final    GFR Non- 07/12/2022 >60  >60 ml/min/1.73m2 Final    Comment:      Estimated GFR is calculated using the Modification of Diet in Renal Disease (MDRD) Study equation, reported for both  Americans (GFRAA) and non- Americans (GFRNA), and normalized to 1.73m2 body surface area. The physician must decide which value applies to the patient. The MDRD study equation should only be used in individuals age 25 or older. It has not been validated for the following: pregnant women, patients with serious comorbid conditions,or on certain medications, or persons with extremes of body size, muscle mass, or nutritional status.       Calcium 07/12/2022 9.1  8.3 - 10.4 MG/DL Final    Total Bilirubin 07/12/2022 0.2  0.2 - 1.1 MG/DL Final    ALT 07/12/2022 16  12 - 65 U/L Final    AST 07/12/2022 14* 15 - 37 U/L Final    Alk Phosphatase 07/12/2022 113  50 - 136 U/L Final    Total Protein 07/12/2022 7.1  6.3 - 8.2 g/dL Final    Albumin 07/12/2022 2.7* 3.5 - 5.0 g/dL Final    Globulin 07/12/2022 4.4* 2.3 - 3.5 g/dL Final    Albumin/Globulin Ratio 07/12/2022 0.6* 1.2 - 3.5   Final    WBC 07/12/2022 8.1  4.3 - 11.1 K/uL Final    RBC 07/12/2022 3.73* 4.05 - 5.2 M/uL Final    Hemoglobin 07/12/2022 8.7* 11.7 - 15.4 g/dL Final    Hematocrit 07/12/2022 29.7* 35.8 - 46.3 % Final    MCV 07/12/2022 79.6  79.6 - 97.8 FL Final    MCH 07/12/2022 23.3* 26.1 - 32.9 PG Final    MCHC 07/12/2022 29.3* 31.4 - 35.0 g/dL Final    RDW 07/12/2022 18.1* 11.9 - 14.6 % Final    Platelets 64/07/9422 276  150 - 450 K/uL Final    MPV 07/12/2022 10.2  9.4 - 12.3 FL Final  nRBC 07/12/2022 0.00  0.0 - 0.2 K/uL Final    **Note: Absolute NRBC parameter is now reported with Hemogram**    Seg Neutrophils 07/12/2022 56  43 - 78 % Final    Lymphocytes 07/12/2022 32  13 - 44 % Final    Monocytes 07/12/2022 8  4.0 - 12.0 % Final    Eosinophils % 07/12/2022 0* 0.5 - 7.8 % Final    Basophils 07/12/2022 1  0.0 - 2.0 % Final    Immature Granulocytes 07/12/2022 3  0.0 - 5.0 % Final    Segs Absolute 07/12/2022 4.6  1.7 - 8.2 K/UL Final    Absolute Lymph # 07/12/2022 2.6  0.5 - 4.6 K/UL Final    Absolute Mono # 07/12/2022 0.6  0.1 - 1.3 K/UL Final    Absolute Eos # 07/12/2022 0.0  0.0 - 0.8 K/UL Final    Basophils Absolute 07/12/2022 0.0  0.0 - 0.2 K/UL Final    Absolute Immature Granulocyte 07/12/2022 0.2  0.0 - 0.5 K/UL Final    Differential Type 07/12/2022 AUTOMATED    Final    WBC, UA 07/12/2022 0-3  0 /hpf Final    RBC, UA 07/12/2022 3-5  0 /hpf Final    Epithelial Cells UA 07/12/2022 3-5  0 /hpf Final    BACTERIA, URINE 07/12/2022 TRACE  0 /hpf Final    Casts 07/12/2022 0  0 /lpf Final    Crystals 07/12/2022 0  0 /LPF Final    Mucus, UA 07/12/2022 TRACE  0 /lpf Final         Radiology:  CT Results (most recent):  No results found for this or any previous visit from the past 365 days. PET Results (most recent):  No results found for this or any previous visit from the past 365 days. TORIBIO Results (most recent):  No results found for this or any previous visit from the past 365 days. US  No results found for this or any previous visit from the past 365 days.          Patient Active Problem List   Diagnosis    COVID-19 vaccine series completed    Family history of Down syndrome    COVID-19 affecting pregnancy in first trimester    High-risk pregnancy in second trimester    Chronic hypertension during pregnancy    Hx of preeclampsia, prior pregnancy, currently pregnant, second trimester    Headache in pregnancy, antepartum, third trimester    Diarrhea during pregnancy    Anemia in pregnancy, third trimester    Gestational diabetes         ASSESSMENT and PLAN    29 y.o. y.o. yo with MEGAN in pregnancy and a history of HMB, MEGAN, with anemia not responsive to oral iron or intolerant of oral iron  1) MEGAN: likely related to prior HMB and ID of pregnancy. Rule out other causes of anemia, work up pending. Recommended IV iron* and specifically addressed type of iron, route, side effects and risks/benefits and patient agrees to proceed. Patient knows to call for any questions or complications, but primary follow up with OB.    -injectafer 750mg IV x 2   -follow up 4 months post delivery to assess iron level and gauge further iron needs  2) HMB: by history and needs evaluation and management after resumption of menses  -follow up post partum 4 months  3) Bleeding Diathesis: approximately 10% risk of bleeding disorder, most common von willebrand or platelet function disorder. VWD testing discussed and will repeat in 3-6 months if first normal and platelet function testing with PFA, plt agg and glycoprotein expression evaluation if VW normal.  Smear to be evaluated for gray plt or megaplts Narcisa Echols). -testing to be completed after pregnancy due to physiologic elevation of VW  Follow up 4 months post delivery  All questions answered; call with issues  Total time 60 min 50% in direct consultation about the patient's diagnosis and management  Yolis Núñez MD  Director, [address] and [address] [place] [address], [address] [place] [address]  AY[employer] Phone        Recent network meta-analysis has established that \"parenteral iron preparations are more effective at increasing hemoglobin concentrations compared with oral preparations\". Furthermore,  IV ferric carboxymaltose was shown to have an overall better impact on hemoglobin rise as compared to iv iron sucrose. Marni Suero, the Lancet/Haematology/ July 2021

## 2022-07-12 NOTE — PATIENT INSTRUCTIONS
Patient Instructions from Today's Visit    Reason for Visit:  New patient visit for iron deficiency anemia in pregnancy (32 weeks)   Reports: gestational diabetes    Plan: Your have laboratory evidence of significant iron deficiency anemia. Discussed the recommendation of IV Iron to improve your iron stores and hemoglobin. Due to your insurance, we can give you IV Venofer (IV iron). You will get 4 doses over 2 weeks. Premedications need to be given with Venofer to help prevent a reaction. You will need a  to drive you home because of that. Follow Up:  IV iron as discussed in the next couple of weeks  Follow up: ~6 months    Recent Lab Results:      Treatment Summary has been discussed and given to patient: NA        -------------------------------------------------------------------------------------------------------------------  Please call our office at [phone] if you have any  of the following symptoms:   · Fever of 100.5 or greater  · Chills  · Shortness of breath  · Swelling or pain in one leg    After office hours an answering service is available and will contact a provider for emergencies or if you are experiencing any of the above symptoms.  Patient has My Chart. My Chart log in information explained on the after visit summary printout at the [facility] [address] desk.

## 2022-07-13 ENCOUNTER — ROUTINE PRENATAL (OUTPATIENT)
Dept: OBGYN CLINIC | Age: 32
End: 2022-07-13
Payer: MEDICAID

## 2022-07-13 VITALS — SYSTOLIC BLOOD PRESSURE: 131 MMHG | DIASTOLIC BLOOD PRESSURE: 74 MMHG

## 2022-07-13 DIAGNOSIS — O26.893 HEADACHE IN PREGNANCY, ANTEPARTUM, THIRD TRIMESTER: ICD-10-CM

## 2022-07-13 DIAGNOSIS — O10.919 CHRONIC HYPERTENSION DURING PREGNANCY: Primary | ICD-10-CM

## 2022-07-13 DIAGNOSIS — R51.9 HEADACHE IN PREGNANCY, ANTEPARTUM, THIRD TRIMESTER: ICD-10-CM

## 2022-07-13 DIAGNOSIS — O09.292 HX OF PREECLAMPSIA, PRIOR PREGNANCY, CURRENTLY PREGNANT, SECOND TRIMESTER: ICD-10-CM

## 2022-07-13 DIAGNOSIS — U07.1 COVID-19 AFFECTING PREGNANCY IN FIRST TRIMESTER: ICD-10-CM

## 2022-07-13 DIAGNOSIS — O09.93 HIGH-RISK PREGNANCY IN THIRD TRIMESTER: ICD-10-CM

## 2022-07-13 DIAGNOSIS — O09.92 HIGH-RISK PREGNANCY IN SECOND TRIMESTER: ICD-10-CM

## 2022-07-13 DIAGNOSIS — O99.013 ANEMIA IN PREGNANCY, THIRD TRIMESTER: ICD-10-CM

## 2022-07-13 DIAGNOSIS — Z82.79 FAMILY HISTORY OF DOWN SYNDROME: ICD-10-CM

## 2022-07-13 DIAGNOSIS — O98.511 COVID-19 AFFECTING PREGNANCY IN FIRST TRIMESTER: ICD-10-CM

## 2022-07-13 DIAGNOSIS — O24.410 DIET CONTROLLED GESTATIONAL DIABETES MELLITUS (GDM) IN THIRD TRIMESTER: ICD-10-CM

## 2022-07-13 PROBLEM — O26.899 DIARRHEA DURING PREGNANCY: Status: RESOLVED | Noted: 2022-06-14 | Resolved: 2022-07-13

## 2022-07-13 PROBLEM — Z92.29 COVID-19 VACCINE SERIES COMPLETED: Status: RESOLVED | Noted: 2022-01-25 | Resolved: 2022-07-13

## 2022-07-13 PROBLEM — R19.7 DIARRHEA DURING PREGNANCY: Status: RESOLVED | Noted: 2022-06-14 | Resolved: 2022-07-13

## 2022-07-13 LAB
ANA SER QL: NEGATIVE
TRANSFERRIN SERPL-SCNC: 45.7 NMOL/L (ref 12.2–27.3)

## 2022-07-13 PROCEDURE — 76819 FETAL BIOPHYS PROFIL W/O NST: CPT | Performed by: OBSTETRICS & GYNECOLOGY

## 2022-07-13 PROCEDURE — 99215 OFFICE O/P EST HI 40 MIN: CPT | Performed by: OBSTETRICS & GYNECOLOGY

## 2022-07-13 PROCEDURE — 76816 OB US FOLLOW-UP PER FETUS: CPT | Performed by: OBSTETRICS & GYNECOLOGY

## 2022-07-13 PROCEDURE — 76820 UMBILICAL ARTERY ECHO: CPT | Performed by: OBSTETRICS & GYNECOLOGY

## 2022-07-13 ASSESSMENT — PATIENT HEALTH QUESTIONNAIRE - PHQ9
SUM OF ALL RESPONSES TO PHQ9 QUESTIONS 1 & 2: 2
2. FEELING DOWN, DEPRESSED OR HOPELESS: 1
SUM OF ALL RESPONSES TO PHQ QUESTIONS 1-9: 2
1. LITTLE INTEREST OR PLEASURE IN DOING THINGS: 1

## 2022-07-13 NOTE — LETTER
[facility] FETAL MEDICINE  [address] [place] [address] [address] [id number]  Phone: [phone]  Fax: [phone]    Jeovanny Ahuja MD      To Whom It May Concern: We are following Sandra Marquez  for a high risk pregnancy. We are recommending that her Primary OB Physician take her out of work for the remainder of the pregnancy due to complications. She will need to be on modified bedrest for the remainder of the pregnancy.      Please send ALL disability forms (including FMLA/STD, etc) to the patient's primary OB Primary OB [facility])    [facility]issel Mackey, [address] [place] [address]  Phone: [phone]  Fax: [phone]      Sincerely,        Dr. Beth Byrne

## 2022-07-13 NOTE — PROGRESS NOTES
191.    22 at [facility]: BP mildly elevated today (137/92). 22 at [facility]: Pt taking Labetalol 200mg TID. Denies PreE symptoms. BP remains WNL. PreE labs from  normal.    · Repeat preeclamptic labs in your office if elevated BP or symptoms (CBC, CMP, LDH, Uric Acid); also do P/C ratio (if elevated,  need to then confirm with 24h urine) or 24 hour urine (for total protein and creatinine clearance)  · Increase Labetalol if BP >140/90  · PreE Warnings given          Iron deficiency 2022     Priority: Medium    Diet controlled gestational diabetes mellitus (GDM) in third trimester 2022     Priority: Medium     2022 at [facility]: Explained the complications of  pulmonary immaturity, stillbirth, shoulder dystocia and fetal hypoglycemia associated with lack of BG compliance. Discussed the long-term impact of GDM on maternal and child health reviewed including maternal DM 2; obesity as child/adult and metabolic syndrome. Risks mitigated by appropriate fetal growth, as well as appropriate dietary and lifestyle choices and breastfeeding for at least 6 months. Log reviewed, most readings WNL. Spoke in length with patient regarding low-carb diet, and meal options. Reviewed carb-counting, regular meal times and snacks. Reinforced recommended carb load ranges per meal; breakfast 15-30 grams and lunch and dinner 30-45 grams. Reviewed and reinforced low carb options with patient. Reminded patient to send in weekly logs. Diet discussed in length and all questions answered. Continue with diet control at this time. Elevated Parameters. · Recommend QID BG testing and send logs weekly to OB and OUR office. · Continue diet therapy. Adjust medication if elevations become consistent.  Anemia in pregnancy, third trimester 2022     Priority: Medium     6/15/22-HGB-8.9-start daily iron. 22 at [facility]: Seeing Hematology for iron infusions.         Hx of preeclampsia, prior pregnancy, currently pregnant, second trimester 2022     Priority: Low     3/29/2022 [facility]: 37wk; mild range BP. Normotensive by 6wk pp.   3/29/2022 at [facility]: BP normal today. 22 24 hour urine crea clear 194, total protein 165        Headache in pregnancy, antepartum, third trimester 2022     Priority: Low     3/29/2022 [facility]: bilateral frontal/parietal. No hx migraines. Initially was noted to coincide with HTN, now normotensive on meds and not coinciding with BP swings. Sx'c care reviewed. Given via [address]  22 at [facility]: no change to headaches, stable. 22 at [facility]: c/o occasional Has    Consideration of these OTC options (tylenol, caffeine, hydration, benadryl, mag oxide up to 800mg BID, riboflavin 400mg daily; mahdu-relief, excedrin migraine). Other non-pharm solutions - BeKool-type head patches, essential oils, dark room, warm compresses. If fails all OTC, then reglan/benadryl combo or imitrex/triptans recommended. Opioid, including fiorcet, may be considered if fails all non-opiate medications. But these are not recommended for use due to rebound headaches and  withdrawal concerns. Ergotamines are not recommended in pregnancy        COVID-19 affecting pregnancy in first trimester 2022     Priority: Low     Tested positive - early first trimester  Vax x2  3/29/2022 at [facility]: no residual symptoms.  No sonographic findings c/w placentitis  · No further testing needed        Family history of Down syndrome 2020     Priority: Low     FOB 1st Maternal Cousin  NIPT Neg            Addressed normal pregnancy complaints, reassured and offered suggestions for care  Reviewed gestational age precautions and activity goals/limitations  Nutritional counseling as well as specific goals based on current maternal and fetal status  Options for GERD therapy if becomes symptomatic over course of pregnancy  Gestational age appropriate preventive care regarding

## 2022-07-14 LAB — PERIPHERAL SMEAR, MD REVIEW: NORMAL

## 2022-07-15 ENCOUNTER — ROUTINE PRENATAL (OUTPATIENT)
Dept: OBGYN CLINIC | Age: 32
End: 2022-07-15
Payer: MEDICAID

## 2022-07-15 VITALS — SYSTOLIC BLOOD PRESSURE: 121 MMHG | DIASTOLIC BLOOD PRESSURE: 81 MMHG | HEART RATE: 91 BPM

## 2022-07-15 DIAGNOSIS — U07.1 COVID-19 AFFECTING PREGNANCY IN FIRST TRIMESTER: ICD-10-CM

## 2022-07-15 DIAGNOSIS — O09.93 HIGH-RISK PREGNANCY IN THIRD TRIMESTER: ICD-10-CM

## 2022-07-15 DIAGNOSIS — O24.410 DIET CONTROLLED GESTATIONAL DIABETES MELLITUS (GDM) IN THIRD TRIMESTER: ICD-10-CM

## 2022-07-15 DIAGNOSIS — O10.919 CHRONIC HYPERTENSION DURING PREGNANCY: Primary | ICD-10-CM

## 2022-07-15 DIAGNOSIS — R51.9 HEADACHE IN PREGNANCY, ANTEPARTUM, THIRD TRIMESTER: ICD-10-CM

## 2022-07-15 DIAGNOSIS — O99.013 ANEMIA IN PREGNANCY, THIRD TRIMESTER: ICD-10-CM

## 2022-07-15 DIAGNOSIS — O26.893 HEADACHE IN PREGNANCY, ANTEPARTUM, THIRD TRIMESTER: ICD-10-CM

## 2022-07-15 DIAGNOSIS — Z3A.32 32 WEEKS GESTATION OF PREGNANCY: ICD-10-CM

## 2022-07-15 DIAGNOSIS — O09.292 HX OF PREECLAMPSIA, PRIOR PREGNANCY, CURRENTLY PREGNANT, SECOND TRIMESTER: ICD-10-CM

## 2022-07-15 DIAGNOSIS — O98.511 COVID-19 AFFECTING PREGNANCY IN FIRST TRIMESTER: ICD-10-CM

## 2022-07-15 PROCEDURE — 76820 UMBILICAL ARTERY ECHO: CPT | Performed by: OBSTETRICS & GYNECOLOGY

## 2022-07-15 PROCEDURE — 76819 FETAL BIOPHYS PROFIL W/O NST: CPT | Performed by: OBSTETRICS & GYNECOLOGY

## 2022-07-15 PROCEDURE — 99215 OFFICE O/P EST HI 40 MIN: CPT | Performed by: OBSTETRICS & GYNECOLOGY

## 2022-07-15 PROCEDURE — 76815 OB US LIMITED FETUS(S): CPT | Performed by: OBSTETRICS & GYNECOLOGY

## 2022-07-15 ASSESSMENT — PATIENT HEALTH QUESTIONNAIRE - PHQ9
SUM OF ALL RESPONSES TO PHQ9 QUESTIONS 1 & 2: 2
2. FEELING DOWN, DEPRESSED OR HOPELESS: 1
SUM OF ALL RESPONSES TO PHQ QUESTIONS 1-9: 2
1. LITTLE INTEREST OR PLEASURE IN DOING THINGS: 1
SUM OF ALL RESPONSES TO PHQ QUESTIONS 1-9: 2

## 2022-07-15 NOTE — PROGRESS NOTES
MFM Consultation    Josue Sauceda (: 1990) is a 29 y.o. [id number] at [address] with 2022, by Ultrasound. 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 Scheduled to see primary OB ([facility]) on     Has occasional headaches, edema. Denies preeclamptic symptoms. Reports good fetal movement. No bleeding, LOF, cramping, ctxs, or vaginal pressure. Interval history since prior appt reviewed and updated as indicated. Review of Systems - per HPI; otherwise unremarkable. Exam:     Vitals:    07/15/22 1143   BP: 121/81   Pulse: 91       Ultrasound: Please see formal ultrasound report under imaging tab. ASSESSMENT/PLAN:  Patient Active Problem List    Diagnosis Date Noted    High-risk pregnancy in third trimester 2022     Priority: High     Genetics- NIPT negative x 3  Glucola-174 (22) needs 3 hour    Flu-declines (22)  Tdap-  SBIRT done at [facility] talk   3/29/2022 at [facility]: Normal early anatomy, AC: 31%, SYED WNL. 5/3/2022 at [facility]: Normal anatomy and echo; AC 43%, Overall 44%, SYED 14 cm, CL 3.8 cm.  2022 at [facility]: Normal anatomy and echo; AC 56%, Overall 60%, SYED 14.5cm (DVP  3.9). 22 at [facility]: Appropriate fetal growth; AC 26%, however low normal SYED= 9 cm, Dopplers WNL, BPP 8/8. Talked to patient about increasing fluids, frequent snacks. Focus on fetal movement counts. 07/15/22 at [facility]: Improved SYED 12.8,  BPP 8/8, S/D 2.73. Very active fetus, no complaints. Twice weekly testing @ OB office. Next appointment   No follow up with MFM, refer back if needed. Growth and BPP at [facility] in 3 weeks.          Chronic hypertension during pregnancy 2022     Priority: High     GHTN and pp preeclampsia with G3    3/22/22 PreE labs WNL, UA 2.9, 24 hr urine 191.    22 at [facility]: BP mildly elevated today (137/92). 22 at [facility]: Pt taking Labetalol 200mg TID. Denies PreE symptoms. BP remains WNL. PreE labs from  normal.    07/15/22 at [facility]: Taking Labetalol 200 mg TID. BP /81    Repeat preeclamptic labs in your office if elevated BP or symptoms (CBC, CMP, LDH, Uric Acid); also do P/C ratio (if elevated,  need to then confirm with 24h urine) or 24 hour urine (for total protein and creatinine clearance)  Increase Labetalol if BP >140/90  PreE Warnings givenTwice weekly testing @ OB office. Next appointment   Growth and BPP at [facility] in 3 weeks. Iron deficiency 2022     Priority: Medium    Diet controlled gestational diabetes mellitus (GDM) in third trimester 2022     Priority: Medium     2022 at [facility]: Explained the complications of  pulmonary immaturity, stillbirth, shoulder dystocia and fetal hypoglycemia associated with lack of BG compliance. Discussed the long-term impact of GDM on maternal and child health reviewed including maternal DM 2; obesity as child/adult and metabolic syndrome. Risks mitigated by appropriate fetal growth, as well as appropriate dietary and lifestyle choices and breastfeeding for at least 6 months. Log reviewed, most readings WNL. Spoke in length with patient regarding low-carb diet, and meal options. Reviewed carb-counting, regular meal times and snacks. Reinforced recommended carb load ranges per meal; breakfast 15-30 grams and lunch and dinner 30-45 grams. Reviewed and reinforced low carb options with patient. Reminded patient to send in weekly logs. Diet discussed in length and all questions answered. Continue with diet control at this time. Elevated Parameters. Recommend QID BG testing and send logs weekly to OB and OUR office. Continue diet therapy. Adjust medication if elevations become consistent. 07/15/22 at [facility]: Glucose log reviewed.   BS ranges 70 to 141.  FBS ranges 70-86   PP BS ranges 129-141     Overall good control. Continue with diet control  Continue BG testing QID sending to OB office and OUR office weekly  Twice weekly testing at [facility] office       Anemia in pregnancy, third trimester 2022     Priority: Medium     6/15/22-HGB-8.9-start daily iron. 22 at [facility]: Seeing Hematology for iron infusions. 07/15/22 at [facility]: Appointment for Venofer infusion on         Hx of preeclampsia, prior pregnancy, currently pregnant, second trimester 2022     Priority: Low     3/29/2022 [facility]: 37wk; mild range BP. Normotensive by 6wk pp.   3/29/2022 at [facility]: BP normal today. 22 24 hour urine crea clear 194, total protein 165        Headache in pregnancy, antepartum, third trimester 2022     Priority: Low     3/29/2022 [facility]: bilateral frontal/parietal. No hx migraines. Initially was noted to coincide with HTN, now normotensive on meds and not coinciding with BP swings. Sx'c care reviewed. Given via [address]  22 at [facility]: no change to headaches, stable. 22 at [facility]: c/o occasional Has  07/15/22 at [facility]: occasional headaches twice a week, stable. Resolved with tylenol    Consideration of these OTC options (tylenol, caffeine, hydration, benadryl, mag oxide up to 800mg BID, riboflavin 400mg daily; madhu-relief, excedrin migraine). Other non-pharm solutions - BeKool-type head patches, essential oils, dark room, warm compresses. If fails all OTC, then reglan/benadryl combo or imitrex/triptans recommended. Opioid, including fiorcet, may be considered if fails all non-opiate medications. But these are not recommended for use due to rebound headaches and  withdrawal concerns. Ergotamines are not recommended in pregnancy        COVID-19 affecting pregnancy in first trimester 2022     Priority: Low     Tested positive - early first trimester  Vax x2  3/29/2022 at [facility]: no residual symptoms.  No sonographic findings c/w placentitis  · No further testing needed        Family history of Down syndrome 06/18/2020     Priority: Low     FOB 1st Maternal Cousin  NIPT Neg            Addressed normal pregnancy complaints, reassured and offered suggestions for care  Reviewed gestational age precautions and activity goals/limitations  Nutritional counseling as well as specific goals based on current maternal and fetal status  Options for GERD therapy if becomes symptomatic over course of pregnancy  Gestational age appropriate preventive care regarding communicable disease transmission and vaccines as appropriate (including flu, TDaP, and COVID.)  Additional plans and concerns as documented in problem list.   All questions answered and concerns discussed. An electronic signature was used to authenticate this note. Eddye Hatchet, MD    I have spent 40 minutes reviewing previous notes, test results and face to face with the patient discussing the diagnosis and importance of compliance with the treatment plan, in addition to ultrasound findings, as well as documenting on the day of the visit (07/15/2022).     Return in 3 week(s)  Growth  BPP      Patient Active Problem List   Diagnosis Code    Family history of Down syndrome Z82.79    COVID-19 affecting pregnancy in first trimester O98.511, U07.1    High-risk pregnancy in third trimester O09.93    Chronic hypertension during pregnancy O10.919    Hx of preeclampsia, prior pregnancy, currently pregnant, second trimester O09.292    Headache in pregnancy, antepartum, third trimester O26.893, R51.9    Anemia in pregnancy, third trimester O99.013    Diet controlled gestational diabetes mellitus (GDM) in third trimester O20.18    Iron deficiency E61.1

## 2022-07-18 ENCOUNTER — HOSPITAL ENCOUNTER (OUTPATIENT)
Dept: DIABETES SERVICES | Age: 32
Setting detail: RECURRING SERIES
Discharge: HOME | End: 2022-07-21
Payer: MEDICAID

## 2022-07-18 ENCOUNTER — ROUTINE PRENATAL (OUTPATIENT)
Dept: OBGYN CLINIC | Age: 32
End: 2022-07-18
Payer: MEDICAID

## 2022-07-18 VITALS
BODY MASS INDEX: 29.38 KG/M2 | WEIGHT: 165.8 LBS | DIASTOLIC BLOOD PRESSURE: 66 MMHG | HEIGHT: 63 IN | SYSTOLIC BLOOD PRESSURE: 114 MMHG

## 2022-07-18 VITALS — WEIGHT: 160 LBS | BODY MASS INDEX: 28.34 KG/M2

## 2022-07-18 DIAGNOSIS — Z13.89 SCREENING FOR GENITOURINARY CONDITION: ICD-10-CM

## 2022-07-18 DIAGNOSIS — O10.919 CHRONIC HYPERTENSION DURING PREGNANCY: Primary | ICD-10-CM

## 2022-07-18 DIAGNOSIS — O24.410 DIET CONTROLLED GESTATIONAL DIABETES MELLITUS (GDM) IN THIRD TRIMESTER: ICD-10-CM

## 2022-07-18 DIAGNOSIS — O09.93 SUPERVISION OF HIGH RISK PREGNANCY IN THIRD TRIMESTER: ICD-10-CM

## 2022-07-18 DIAGNOSIS — U07.1 COVID-19 AFFECTING PREGNANCY IN FIRST TRIMESTER: ICD-10-CM

## 2022-07-18 DIAGNOSIS — Z3A.33 33 WEEKS GESTATION OF PREGNANCY: ICD-10-CM

## 2022-07-18 DIAGNOSIS — O98.511 COVID-19 AFFECTING PREGNANCY IN FIRST TRIMESTER: ICD-10-CM

## 2022-07-18 LAB
GLUCOSE URINE, POC: NEGATIVE
PROTEIN,URINE, POC: NORMAL

## 2022-07-18 PROCEDURE — 99213 OFFICE O/P EST LOW 20 MIN: CPT | Performed by: OBSTETRICS & GYNECOLOGY

## 2022-07-18 PROCEDURE — G0109 DIAB MANAGE TRN IND/GROUP: HCPCS

## 2022-07-18 PROCEDURE — 81002 URINALYSIS NONAUTO W/O SCOPE: CPT | Performed by: OBSTETRICS & GYNECOLOGY

## 2022-07-18 PROCEDURE — 76819 FETAL BIOPHYS PROFIL W/O NST: CPT | Performed by: OBSTETRICS & GYNECOLOGY

## 2022-07-18 NOTE — PROGRESS NOTES
This is a class  appointment with limited persons allowed in class due to YHOXH-47 public health emergency. Social distancing and mandatory precautions are in place and utilized. Gestational Diabetes Self-Management Support Plan    Services Provided: Pt received education on nutrition and meal planning during pregnancy. Emotional support for adjustment to diagnosis was provided. Care Plan/Recommendations:  Pt instructed to record blood sugar 4x/day and record all meals and snacks. Pt to bring information to appointments with [facility]. Notes for Follow Up:   1. Barriers to checking blood glucose and adherence to meal plan identified: Was told her target blood sugar 1 hr from first bite of meal is 140 stating the baby is measuring small. 2. Barriers to psychological adjustment to diagnosis identified: none  3. Patient needs to be seen by [facility] ASAP due to: 8/4/22.       Rosi Live, [address], LD, CDE  [facility]

## 2022-07-18 NOTE — PROGRESS NOTES
C/o fatigue, headaches (helped with tylenol), increased swelling in feet bilaterally-worse in the evenings    Denies n/v, cramping, ctx, bleeding, spotting

## 2022-07-19 ENCOUNTER — HOSPITAL ENCOUNTER (OUTPATIENT)
Dept: INFUSION THERAPY | Age: 32
Discharge: HOME | End: 2022-07-19
Payer: MEDICAID

## 2022-07-19 VITALS
RESPIRATION RATE: 18 BRPM | TEMPERATURE: 98 F | BODY MASS INDEX: 27.71 KG/M2 | HEART RATE: 102 BPM | WEIGHT: 156.4 LBS | DIASTOLIC BLOOD PRESSURE: 76 MMHG | OXYGEN SATURATION: 100 % | SYSTOLIC BLOOD PRESSURE: 137 MMHG

## 2022-07-19 DIAGNOSIS — E61.1 IRON DEFICIENCY: Primary | ICD-10-CM

## 2022-07-19 DIAGNOSIS — O99.013 ANEMIA IN PREGNANCY, THIRD TRIMESTER: ICD-10-CM

## 2022-07-19 PROCEDURE — 96375 TX/PRO/DX INJ NEW DRUG ADDON: CPT

## 2022-07-19 PROCEDURE — 6370000000 HC RX 637 (ALT 250 FOR IP): Performed by: PEDIATRICS

## 2022-07-19 PROCEDURE — 2580000003 HC RX 258: Performed by: PEDIATRICS

## 2022-07-19 PROCEDURE — 96366 THER/PROPH/DIAG IV INF ADDON: CPT

## 2022-07-19 PROCEDURE — 96365 THER/PROPH/DIAG IV INF INIT: CPT

## 2022-07-19 PROCEDURE — 6360000002 HC RX W HCPCS: Performed by: PEDIATRICS

## 2022-07-19 RX ORDER — SODIUM CHLORIDE 9 MG/ML
INJECTION, SOLUTION INTRAVENOUS CONTINUOUS
Status: CANCELLED | OUTPATIENT
Start: 2022-07-22

## 2022-07-19 RX ORDER — ACETAMINOPHEN 325 MG/1
650 TABLET ORAL ONCE
Status: COMPLETED | OUTPATIENT
Start: 2022-07-19 | End: 2022-07-19

## 2022-07-19 RX ORDER — MEPERIDINE HYDROCHLORIDE 25 MG/ML
12.5 INJECTION INTRAMUSCULAR; INTRAVENOUS; SUBCUTANEOUS PRN
Status: DISCONTINUED | OUTPATIENT
Start: 2022-07-19 | End: 2022-07-20 | Stop reason: HOSPADM

## 2022-07-19 RX ORDER — EPINEPHRINE 1 MG/ML
0.3 INJECTION, SOLUTION, CONCENTRATE INTRAVENOUS PRN
Status: DISCONTINUED | OUTPATIENT
Start: 2022-07-19 | End: 2022-07-20 | Stop reason: HOSPADM

## 2022-07-19 RX ORDER — MEPERIDINE HYDROCHLORIDE 25 MG/ML
12.5 INJECTION INTRAMUSCULAR; INTRAVENOUS; SUBCUTANEOUS PRN
Status: CANCELLED | OUTPATIENT
Start: 2022-07-22

## 2022-07-19 RX ORDER — SODIUM CHLORIDE 0.9 % (FLUSH) 0.9 %
5-40 SYRINGE (ML) INJECTION PRN
Status: CANCELLED | OUTPATIENT
Start: 2022-07-22

## 2022-07-19 RX ORDER — SODIUM CHLORIDE 9 MG/ML
INJECTION, SOLUTION INTRAVENOUS CONTINUOUS
Status: DISCONTINUED | OUTPATIENT
Start: 2022-07-19 | End: 2022-07-20 | Stop reason: HOSPADM

## 2022-07-19 RX ORDER — DIPHENHYDRAMINE HYDROCHLORIDE 50 MG/ML
50 INJECTION INTRAMUSCULAR; INTRAVENOUS
Status: ACTIVE | OUTPATIENT
Start: 2022-07-19 | End: 2022-07-19

## 2022-07-19 RX ORDER — ONDANSETRON 2 MG/ML
8 INJECTION INTRAMUSCULAR; INTRAVENOUS
Status: CANCELLED | OUTPATIENT
Start: 2022-07-22

## 2022-07-19 RX ORDER — SODIUM CHLORIDE 0.9 % (FLUSH) 0.9 %
5-40 SYRINGE (ML) INJECTION PRN
Status: DISCONTINUED | OUTPATIENT
Start: 2022-07-19 | End: 2022-07-20 | Stop reason: HOSPADM

## 2022-07-19 RX ORDER — DIPHENHYDRAMINE HYDROCHLORIDE 50 MG/ML
25 INJECTION INTRAMUSCULAR; INTRAVENOUS ONCE
Status: CANCELLED | OUTPATIENT
Start: 2022-07-22 | End: 2022-07-22

## 2022-07-19 RX ORDER — ONDANSETRON 2 MG/ML
8 INJECTION INTRAMUSCULAR; INTRAVENOUS
Status: ACTIVE | OUTPATIENT
Start: 2022-07-19 | End: 2022-07-19

## 2022-07-19 RX ORDER — ACETAMINOPHEN 325 MG/1
650 TABLET ORAL
Status: CANCELLED | OUTPATIENT
Start: 2022-07-22

## 2022-07-19 RX ORDER — DIPHENHYDRAMINE HYDROCHLORIDE 50 MG/ML
50 INJECTION INTRAMUSCULAR; INTRAVENOUS
Status: CANCELLED | OUTPATIENT
Start: 2022-07-22

## 2022-07-19 RX ORDER — ACETAMINOPHEN 325 MG/1
650 TABLET ORAL ONCE
Status: CANCELLED | OUTPATIENT
Start: 2022-07-22 | End: 2022-07-22

## 2022-07-19 RX ORDER — DIPHENHYDRAMINE HYDROCHLORIDE 50 MG/ML
25 INJECTION INTRAMUSCULAR; INTRAVENOUS ONCE
Status: COMPLETED | OUTPATIENT
Start: 2022-07-19 | End: 2022-07-19

## 2022-07-19 RX ORDER — EPINEPHRINE 1 MG/ML
0.3 INJECTION, SOLUTION, CONCENTRATE INTRAVENOUS PRN
Status: CANCELLED | OUTPATIENT
Start: 2022-07-22

## 2022-07-19 RX ORDER — HEPARIN SODIUM (PORCINE) LOCK FLUSH IV SOLN 100 UNIT/ML 100 UNIT/ML
500 SOLUTION INTRAVENOUS PRN
Status: CANCELLED | OUTPATIENT
Start: 2022-07-22

## 2022-07-19 RX ORDER — ALBUTEROL SULFATE 90 UG/1
4 AEROSOL, METERED RESPIRATORY (INHALATION) PRN
Status: DISCONTINUED | OUTPATIENT
Start: 2022-07-19 | End: 2022-07-20 | Stop reason: HOSPADM

## 2022-07-19 RX ORDER — SODIUM CHLORIDE 9 MG/ML
5-250 INJECTION, SOLUTION INTRAVENOUS PRN
Status: CANCELLED | OUTPATIENT
Start: 2022-07-22

## 2022-07-19 RX ORDER — ACETAMINOPHEN 325 MG/1
650 TABLET ORAL
Status: ACTIVE | OUTPATIENT
Start: 2022-07-19 | End: 2022-07-19

## 2022-07-19 RX ORDER — ALBUTEROL SULFATE 90 UG/1
4 AEROSOL, METERED RESPIRATORY (INHALATION) PRN
Status: CANCELLED | OUTPATIENT
Start: 2022-07-22

## 2022-07-19 RX ADMIN — SODIUM CHLORIDE, PRESERVATIVE FREE 10 ML: 5 INJECTION INTRAVENOUS at 14:35

## 2022-07-19 RX ADMIN — SODIUM CHLORIDE: 9 INJECTION, SOLUTION INTRAVENOUS at 14:44

## 2022-07-19 RX ADMIN — DIPHENHYDRAMINE HYDROCHLORIDE 25 MG: 50 INJECTION, SOLUTION INTRAMUSCULAR; INTRAVENOUS at 14:43

## 2022-07-19 RX ADMIN — SODIUM CHLORIDE, PRESERVATIVE FREE 10 ML: 5 INJECTION INTRAVENOUS at 16:45

## 2022-07-19 RX ADMIN — ACETAMINOPHEN 650 MG: 325 TABLET ORAL at 14:43

## 2022-07-19 RX ADMIN — IRON SUCROSE 300 MG: 20 INJECTION, SOLUTION INTRAVENOUS at 15:09

## 2022-07-19 NOTE — PROGRESS NOTES
Arrived to the [facility]. Colleen completed. Patient tolerated well. Any issues or concerns during appointment: none. Patient aware of next infusion appointment on 07/22/22 (date) at [phone] (time). Patient instructed to call provider with temperature of 100.4 or greater or nausea/vomiting/ diarrhea or pain not controlled by medications  Discharged ambulatory.

## 2022-07-21 ENCOUNTER — ROUTINE PRENATAL (OUTPATIENT)
Dept: OBGYN CLINIC | Age: 32
End: 2022-07-21
Payer: MEDICAID

## 2022-07-21 VITALS
WEIGHT: 163.8 LBS | BODY MASS INDEX: 29.02 KG/M2 | SYSTOLIC BLOOD PRESSURE: 108 MMHG | DIASTOLIC BLOOD PRESSURE: 60 MMHG | HEIGHT: 63 IN

## 2022-07-21 DIAGNOSIS — Z13.89 SCREENING FOR GENITOURINARY CONDITION: ICD-10-CM

## 2022-07-21 DIAGNOSIS — O24.410 DIET CONTROLLED GESTATIONAL DIABETES MELLITUS (GDM) IN THIRD TRIMESTER: ICD-10-CM

## 2022-07-21 DIAGNOSIS — O98.511 COVID-19 AFFECTING PREGNANCY IN FIRST TRIMESTER: ICD-10-CM

## 2022-07-21 DIAGNOSIS — U07.1 COVID-19 AFFECTING PREGNANCY IN FIRST TRIMESTER: ICD-10-CM

## 2022-07-21 DIAGNOSIS — O10.919 CHRONIC HYPERTENSION DURING PREGNANCY: Primary | ICD-10-CM

## 2022-07-21 LAB
GLUCOSE URINE, POC: NEGATIVE
PROTEIN,URINE, POC: NORMAL

## 2022-07-21 PROCEDURE — 81002 URINALYSIS NONAUTO W/O SCOPE: CPT | Performed by: OBSTETRICS & GYNECOLOGY

## 2022-07-21 PROCEDURE — 76819 FETAL BIOPHYS PROFIL W/O NST: CPT | Performed by: OBSTETRICS & GYNECOLOGY

## 2022-07-21 PROCEDURE — 99213 OFFICE O/P EST LOW 20 MIN: CPT | Performed by: OBSTETRICS & GYNECOLOGY

## 2022-07-21 NOTE — PROGRESS NOTES
C/o cramping, headaches occasionally, c/o swelling in feet in the evenings bilaterally    Denies bleeding, spotting, n/v

## 2022-07-22 ENCOUNTER — HOSPITAL ENCOUNTER (OUTPATIENT)
Dept: INFUSION THERAPY | Age: 32
Discharge: HOME | End: 2022-07-22
Payer: MEDICAID

## 2022-07-22 VITALS
TEMPERATURE: 98.1 F | HEART RATE: 94 BPM | DIASTOLIC BLOOD PRESSURE: 77 MMHG | RESPIRATION RATE: 16 BRPM | SYSTOLIC BLOOD PRESSURE: 124 MMHG | OXYGEN SATURATION: 98 %

## 2022-07-22 DIAGNOSIS — E61.1 IRON DEFICIENCY: Primary | ICD-10-CM

## 2022-07-22 DIAGNOSIS — O99.013 ANEMIA IN PREGNANCY, THIRD TRIMESTER: ICD-10-CM

## 2022-07-22 PROCEDURE — 96413 CHEMO IV INFUSION 1 HR: CPT

## 2022-07-22 PROCEDURE — 2580000003 HC RX 258: Performed by: PEDIATRICS

## 2022-07-22 PROCEDURE — 6360000002 HC RX W HCPCS: Performed by: PEDIATRICS

## 2022-07-22 PROCEDURE — 96375 TX/PRO/DX INJ NEW DRUG ADDON: CPT

## 2022-07-22 PROCEDURE — 6370000000 HC RX 637 (ALT 250 FOR IP): Performed by: PEDIATRICS

## 2022-07-22 PROCEDURE — 96365 THER/PROPH/DIAG IV INF INIT: CPT

## 2022-07-22 RX ORDER — ONDANSETRON 2 MG/ML
8 INJECTION INTRAMUSCULAR; INTRAVENOUS
Status: ACTIVE | OUTPATIENT
Start: 2022-07-22 | End: 2022-07-22

## 2022-07-22 RX ORDER — SODIUM CHLORIDE 9 MG/ML
INJECTION, SOLUTION INTRAVENOUS CONTINUOUS
Status: DISCONTINUED | OUTPATIENT
Start: 2022-07-22 | End: 2022-07-23 | Stop reason: HOSPADM

## 2022-07-22 RX ORDER — MEPERIDINE HYDROCHLORIDE 25 MG/ML
12.5 INJECTION INTRAMUSCULAR; INTRAVENOUS; SUBCUTANEOUS PRN
Status: CANCELLED | OUTPATIENT
Start: 2022-07-26

## 2022-07-22 RX ORDER — EPINEPHRINE 1 MG/ML
0.3 INJECTION, SOLUTION, CONCENTRATE INTRAVENOUS PRN
Status: CANCELLED | OUTPATIENT
Start: 2022-07-26

## 2022-07-22 RX ORDER — HEPARIN SODIUM (PORCINE) LOCK FLUSH IV SOLN 100 UNIT/ML 100 UNIT/ML
500 SOLUTION INTRAVENOUS PRN
Status: CANCELLED | OUTPATIENT
Start: 2022-07-26

## 2022-07-22 RX ORDER — DIPHENHYDRAMINE HYDROCHLORIDE 50 MG/ML
25 INJECTION INTRAMUSCULAR; INTRAVENOUS ONCE
Status: CANCELLED | OUTPATIENT
Start: 2022-07-26 | End: 2022-07-26

## 2022-07-22 RX ORDER — EPINEPHRINE 1 MG/ML
0.3 INJECTION, SOLUTION, CONCENTRATE INTRAVENOUS PRN
Status: DISCONTINUED | OUTPATIENT
Start: 2022-07-22 | End: 2022-07-23 | Stop reason: HOSPADM

## 2022-07-22 RX ORDER — ONDANSETRON 2 MG/ML
8 INJECTION INTRAMUSCULAR; INTRAVENOUS
Status: CANCELLED | OUTPATIENT
Start: 2022-07-26

## 2022-07-22 RX ORDER — DIPHENHYDRAMINE HYDROCHLORIDE 50 MG/ML
25 INJECTION INTRAMUSCULAR; INTRAVENOUS ONCE
Status: COMPLETED | OUTPATIENT
Start: 2022-07-22 | End: 2022-07-22

## 2022-07-22 RX ORDER — ALBUTEROL SULFATE 90 UG/1
4 AEROSOL, METERED RESPIRATORY (INHALATION) PRN
Status: DISCONTINUED | OUTPATIENT
Start: 2022-07-22 | End: 2022-07-23 | Stop reason: HOSPADM

## 2022-07-22 RX ORDER — ACETAMINOPHEN 325 MG/1
650 TABLET ORAL ONCE
Status: CANCELLED | OUTPATIENT
Start: 2022-07-26 | End: 2022-07-26

## 2022-07-22 RX ORDER — SODIUM CHLORIDE 9 MG/ML
INJECTION, SOLUTION INTRAVENOUS CONTINUOUS
Status: CANCELLED | OUTPATIENT
Start: 2022-07-26

## 2022-07-22 RX ORDER — DIPHENHYDRAMINE HYDROCHLORIDE 50 MG/ML
50 INJECTION INTRAMUSCULAR; INTRAVENOUS
Status: CANCELLED | OUTPATIENT
Start: 2022-07-26

## 2022-07-22 RX ORDER — ALBUTEROL SULFATE 90 UG/1
4 AEROSOL, METERED RESPIRATORY (INHALATION) PRN
Status: CANCELLED | OUTPATIENT
Start: 2022-07-26

## 2022-07-22 RX ORDER — ACETAMINOPHEN 325 MG/1
650 TABLET ORAL ONCE
Status: COMPLETED | OUTPATIENT
Start: 2022-07-22 | End: 2022-07-22

## 2022-07-22 RX ORDER — HEPARIN SODIUM (PORCINE) LOCK FLUSH IV SOLN 100 UNIT/ML 100 UNIT/ML
500 SOLUTION INTRAVENOUS PRN
Status: DISCONTINUED | OUTPATIENT
Start: 2022-07-22 | End: 2022-07-23 | Stop reason: HOSPADM

## 2022-07-22 RX ORDER — ACETAMINOPHEN 325 MG/1
650 TABLET ORAL
Status: CANCELLED | OUTPATIENT
Start: 2022-07-26

## 2022-07-22 RX ORDER — DIPHENHYDRAMINE HYDROCHLORIDE 50 MG/ML
50 INJECTION INTRAMUSCULAR; INTRAVENOUS
Status: ACTIVE | OUTPATIENT
Start: 2022-07-22 | End: 2022-07-22

## 2022-07-22 RX ORDER — ACETAMINOPHEN 325 MG/1
650 TABLET ORAL
Status: ACTIVE | OUTPATIENT
Start: 2022-07-22 | End: 2022-07-22

## 2022-07-22 RX ORDER — SODIUM CHLORIDE 0.9 % (FLUSH) 0.9 %
5-40 SYRINGE (ML) INJECTION PRN
Status: CANCELLED | OUTPATIENT
Start: 2022-07-26

## 2022-07-22 RX ORDER — SODIUM CHLORIDE 0.9 % (FLUSH) 0.9 %
5-40 SYRINGE (ML) INJECTION PRN
Status: DISCONTINUED | OUTPATIENT
Start: 2022-07-22 | End: 2022-07-23 | Stop reason: HOSPADM

## 2022-07-22 RX ORDER — MEPERIDINE HYDROCHLORIDE 25 MG/ML
12.5 INJECTION INTRAMUSCULAR; INTRAVENOUS; SUBCUTANEOUS PRN
Status: DISCONTINUED | OUTPATIENT
Start: 2022-07-22 | End: 2022-07-23 | Stop reason: HOSPADM

## 2022-07-22 RX ORDER — SODIUM CHLORIDE 9 MG/ML
5-250 INJECTION, SOLUTION INTRAVENOUS PRN
Status: DISCONTINUED | OUTPATIENT
Start: 2022-07-22 | End: 2022-07-23 | Stop reason: HOSPADM

## 2022-07-22 RX ORDER — SODIUM CHLORIDE 9 MG/ML
5-250 INJECTION, SOLUTION INTRAVENOUS PRN
Status: CANCELLED | OUTPATIENT
Start: 2022-07-26

## 2022-07-22 RX ADMIN — ACETAMINOPHEN 650 MG: 325 TABLET ORAL at 14:40

## 2022-07-22 RX ADMIN — IRON SUCROSE 300 MG: 20 INJECTION, SOLUTION INTRAVENOUS at 15:07

## 2022-07-22 RX ADMIN — SODIUM CHLORIDE 50 ML/HR: 9 INJECTION, SOLUTION INTRAVENOUS at 14:34

## 2022-07-22 RX ADMIN — DIPHENHYDRAMINE HYDROCHLORIDE 25 MG: 50 INJECTION, SOLUTION INTRAMUSCULAR; INTRAVENOUS at 14:41

## 2022-07-22 RX ADMIN — SODIUM CHLORIDE, PRESERVATIVE FREE 10 ML: 5 INJECTION INTRAVENOUS at 17:07

## 2022-07-22 NOTE — PROGRESS NOTES
Pt arrived ambulatory. Premeds and Venofer completed without complications. Pt waited 30 minutes post iron infusion, no adverse reaction noted. Pt aware of next appt 7/26 at 1430. Discharged ambulatory, no distress noted, no complaints.   Patient instructed to call provider with temperature of 100.4 or greater or nausea/vomiting/ diarrhea or pain not controlled by medications

## 2022-07-24 ENCOUNTER — HOSPITAL ENCOUNTER (OUTPATIENT)
Age: 32
Discharge: HOME | End: 2022-07-24
Attending: OBSTETRICS & GYNECOLOGY | Admitting: OBSTETRICS & GYNECOLOGY
Payer: MEDICAID

## 2022-07-24 VITALS
TEMPERATURE: 97.9 F | HEART RATE: 112 BPM | SYSTOLIC BLOOD PRESSURE: 133 MMHG | RESPIRATION RATE: 18 BRPM | DIASTOLIC BLOOD PRESSURE: 77 MMHG | OXYGEN SATURATION: 99 %

## 2022-07-24 PROBLEM — O47.03 PRETERM UTERINE CONTRACTIONS IN THIRD TRIMESTER, ANTEPARTUM: Status: ACTIVE | Noted: 2022-07-24

## 2022-07-24 PROCEDURE — 59025 FETAL NON-STRESS TEST: CPT

## 2022-07-24 PROCEDURE — 99284 EMERGENCY DEPT VISIT MOD MDM: CPT

## 2022-07-24 RX ORDER — ONDANSETRON 2 MG/ML
4 INJECTION INTRAMUSCULAR; INTRAVENOUS EVERY 6 HOURS PRN
Status: DISCONTINUED | OUTPATIENT
Start: 2022-07-24 | End: 2022-07-24 | Stop reason: HOSPADM

## 2022-07-24 RX ORDER — ONDANSETRON 4 MG/1
4 TABLET, ORALLY DISINTEGRATING ORAL EVERY 8 HOURS PRN
Status: DISCONTINUED | OUTPATIENT
Start: 2022-07-24 | End: 2022-07-24 | Stop reason: HOSPADM

## 2022-07-24 RX ORDER — ACETAMINOPHEN 325 MG/1
650 TABLET ORAL EVERY 4 HOURS PRN
Status: DISCONTINUED | OUTPATIENT
Start: 2022-07-24 | End: 2022-07-24 | Stop reason: HOSPADM

## 2022-07-24 NOTE — PROGRESS NOTES
Patient presents to [facility] with complaints of abdominal cramping all day. States that it comes and goes in waves. Patient initial BP a little elevated. Patient states that she has been out and about all day and only took her am lebetalol, skipping a dose. US and toco placed on soft, non-tender abdomen at this time. (4) no impairment

## 2022-07-25 ENCOUNTER — ROUTINE PRENATAL (OUTPATIENT)
Dept: OBGYN CLINIC | Age: 32
End: 2022-07-25
Payer: MEDICAID

## 2022-07-25 VITALS
WEIGHT: 166 LBS | HEIGHT: 63 IN | SYSTOLIC BLOOD PRESSURE: 102 MMHG | BODY MASS INDEX: 29.41 KG/M2 | DIASTOLIC BLOOD PRESSURE: 64 MMHG

## 2022-07-25 DIAGNOSIS — O24.410 DIET CONTROLLED GESTATIONAL DIABETES MELLITUS (GDM) IN THIRD TRIMESTER: ICD-10-CM

## 2022-07-25 DIAGNOSIS — O09.93 SUPERVISION OF HIGH RISK PREGNANCY IN THIRD TRIMESTER: Primary | ICD-10-CM

## 2022-07-25 DIAGNOSIS — O10.919 CHRONIC HYPERTENSION DURING PREGNANCY: ICD-10-CM

## 2022-07-25 DIAGNOSIS — R51.9 HEADACHE IN PREGNANCY, ANTEPARTUM, THIRD TRIMESTER: ICD-10-CM

## 2022-07-25 DIAGNOSIS — U07.1 COVID-19 AFFECTING PREGNANCY IN FIRST TRIMESTER: ICD-10-CM

## 2022-07-25 DIAGNOSIS — O98.511 COVID-19 AFFECTING PREGNANCY IN FIRST TRIMESTER: ICD-10-CM

## 2022-07-25 DIAGNOSIS — O26.893 HEADACHE IN PREGNANCY, ANTEPARTUM, THIRD TRIMESTER: ICD-10-CM

## 2022-07-25 DIAGNOSIS — Z13.89 SCREENING FOR GENITOURINARY CONDITION: ICD-10-CM

## 2022-07-25 DIAGNOSIS — Z3A.34 34 WEEKS GESTATION OF PREGNANCY: ICD-10-CM

## 2022-07-25 LAB
GLUCOSE URINE, POC: NEGATIVE
PROTEIN,URINE, POC: NORMAL

## 2022-07-25 PROCEDURE — 81002 URINALYSIS NONAUTO W/O SCOPE: CPT | Performed by: OBSTETRICS & GYNECOLOGY

## 2022-07-25 PROCEDURE — 99213 OFFICE O/P EST LOW 20 MIN: CPT | Performed by: OBSTETRICS & GYNECOLOGY

## 2022-07-25 PROCEDURE — 76819 FETAL BIOPHYS PROFIL W/O NST: CPT | Performed by: OBSTETRICS & GYNECOLOGY

## 2022-07-25 NOTE — PROGRESS NOTES
Went to triage yesterday. Taking labetalol tid. Blood sugars: 70's to 120's. Having laxed her goals a bit because baby on smaller side. katja 10cm and bpp 8/8. Kick count and ptl precautions. rtc for testing. Seeing mfm every 3 weeks for growth. Seeing mfm back on 8/4. Cervix not dilated at triage her her report.

## 2022-07-25 NOTE — DISCHARGE INSTRUCTIONS
Weeks 32 to 34 of Your Pregnancy: Care Instructions  Overview     During the last few weeks of your pregnancy, you may have more aches and pains. It's important to rest when you can. Your growing baby is putting more pressure on your bladder. So you may need to urinate more often. Hemorrhoids are also common. These are painful, itchy veinsin the rectal area. You may want to talk with your doctor about banking your baby's umbilical cord blood. This is the blood left in the cord after birth. If you want to save thisblood, you must arrange it ahead of time. You can't decide at the last minute. If you haven't already had the Tdap shot during this pregnancy, talk to your doctor about getting it. It will help protect your  against pertussisinfection. Follow-up care is a key part of your treatment and safety. Be sure to make and go to all appointments, and call your doctor if you are having problems. It's also a good idea to know your test results and keep alist of the medicines you take. How can you care for yourself at home? Ease hemorrhoids  Get more liquids, fruits, vegetables, and fiber in your diet. This will help keep your stools soft. Avoid sitting for too long. Lie on your left side several times a day. Clean yourself with soft, moist toilet paper. Or you can use witch hazel pads or personal hygiene pads. If you are uncomfortable, try ice packs. Or you can sit in a warm sitz bath. Do these for 20 minutes at a time, as needed. Use hydrocortisone cream for pain and itching. Two examples are Anusol and Preparation H Hydrocortisone. Ask your doctor about taking an over-the-counter stool softener. Consider breastfeeding  Experts recommend breastfeeding for 1 year or longer. Breast milk may help protect your child from some health problems.  babies are less likely than formula-fed babies to:  Get ear infections, colds, diarrhea, and pneumonia.   Be obese or get diabetes later in life.  Breastfeeding causes the release of a hormone called oxytocin. This hormone may help your uterus shrink back faster. Breastfeeding may help you lose weight faster. Making milk burns calories. Breastfeeding can lower your risk of breast cancer, ovarian cancer, and osteoporosis. Decide about circumcision for your baby  As you make this decision, it may help to think about your personal, Baptism, and family traditions. You get to decide if you will keep your baby's penis natural or if your baby will be circumcised. If you decide that you would like to have your baby circumcised, talk with your doctor. You can share your concerns about pain. And you can discuss your preferences for anesthesia. Where can you learn more? Go to [email or web address] and sign in to your Fanbouts account. Enter E763 in the Datamolino box to learn more about \"Weeks 32 to 34 of Your Pregnancy: Care Instructions. \"     If you do not have an account, please click on the \"Sign Up Now\" link. Current as of: February 23, 2022               Content Version: 13.3  © 2006-2022 Healthwise, Incorporated. Care instructions adapted under license by [facility] ([facility]). If you have questions about a medical condition or this instruction, always ask your healthcare professional. [address] any warranty or liability for your use of this information.

## 2022-07-25 NOTE — H&P
History & Physical    Name: Skyler Dennison MRN: [id number]  SSN: [id number]    YOB: 1990  Age: 29 y.o. Sex: female        Subjective: Abdominal pain  HPI: 30 yo [id number] at 33+6wks presents c/o abdominal pain which \"comes and goes\" and is associated with tightening. Denies N/V/Diarrhea. Reports normal FM. Pain started this afternoon. Denies lof or vb. Has a h/o 40wk  and 37+ wk IOL. Pregnancy has been c/b headaches, CHTN on Labetalol 200mg po tid, GDM diet controlled, anemia, h/o preeclampsia, Covid 1st trimester. Pt did not take midday dose of Labetalol today. Estimated Date of Delivery: 22  OB History    Para Term  AB Living   4 2 2   1 2   SAB IAB Ectopic Molar Multiple Live Births             2      # Outcome Date GA Lbr Hiro/2nd Weight Sex Delivery Anes PTL Lv   4 Current            3 Term 20 37w1d 07:19 / 00:14 7 lb 3.5 oz (3.275 kg) F Vag-Spont EPI N ROBERT   2 Term 11/09/10 40w0d  6 lb 7 oz (2.92 kg) F Vag-Spont   ROBERT      Birth CommentsFrazier Burn    1 AB  11w0d             Birth Comments: D&E performed in [facility]       Past Medical History:   Diagnosis Date    Anemia     Chronic hypertension during pregnancy     COVID-19 vaccine series completed 2022    2nd COVID vaccine, 2021      Diarrhea during pregnancy 22 at [facility]: c/o diarrhea. Had Chronic Diarrhea in last pregnancy in 3rd trimester. Nonpainful, no blood. Instructed to take 2 peptobismol caplets daily and immodium as needed.     Eclampsia     Gestational diabetes 2022    Gestational hypertension     Gestational hypertension, third trimester 2020    Hypertension     Iron deficiency 2022    Miscarriage     Preeclampsia     2020 pregnancy    Preeclampsia in postpartum period      delivery      Past Surgical History:   Procedure Laterality Date    DILATION AND EVACUATION OF UTERUS       Social History     Occupational History    Not on file   Tobacco Use Smoking status: Never    Smokeless tobacco: Never   Vaping Use    Vaping Use: Never used   Substance and Sexual Activity    Alcohol use: Not Currently    Drug use: Not Currently    Sexual activity: Not Currently     Partners: Male     Birth control/protection: None     Family History   Problem Relation Age of Onset    Hypertension Mother     Cancer Maternal Grandmother         brain        No Known Allergies  Prior to Admission medications    Medication Sig Start Date End Date Taking? Authorizing Provider   acetaminophen (TYLENOL) 500 MG tablet Take 1,000 mg by mouth every 6 hours as needed    Historical Provider, MD   blood glucose monitor strips Use to check blood sugar readings 4 times daily 7/8/22   Tucker Braga MD   blood glucose monitor kit and supplies Please supply patient with a glucose monitor that is covered by insurance that can be used to check blood sugar readings 4 times daily 7/8/22   Tucker Braga MD   Lancets MISC 1 each by Does not apply route daily Use to check blood sugar readings 4 times daily 7/8/22   Tucker Braga MD   labetalol (NORMODYNE) 200 MG tablet Take 1 tablet by mouth 3 times daily 6/14/22   Arline Kaur MD   Prenat w/o V-SI-Junufob-FA-DHA (PNV-DHA PO) Take by mouth    Historical Provider, MD   aspirin 81 MG chewable tablet Take 162 mg by mouth daily     Ar Automatic Reconciliation   Cholecalciferol 50 MCG (2000 UT) CAPS Take 2,000 Units by mouth daily    Ar Automatic Reconciliation   ondansetron (ZOFRAN-ODT) 4 MG disintegrating tablet Take 4 mg by mouth every 8 hours as needed 3/22/22   Ar Automatic Reconciliation        Review of Systems: Pertinent items are noted in HPI. 10 point ROS is otherwise positive for general discomforts in pregnancy.     Objective:     Vitals:  Vitals:    07/24/22 1924 07/24/22 1940 07/24/22 1954   BP: (!) 143/89 132/78 133/77   Pulse: (!) 112 (!) 108 (!) 112   Resp: 18     Temp: 97.9 °F (36.6 °C)     TempSrc: Oral SpO2: 99%          Physical Exam:   Patient without distress  Abdomen: soft, nontender  Fundus: soft and non tender  Perineum: blood absent, amniotic fluidabsent  Cervical Exam: 0 cm dilated    50% effaced    -2 station    Lower Extremities:  - Edema 1+  Membranes:  Intact  Fetal Heart Rate: Baseline: 130s per minute  Variability: moderate  Accelerations: yes  Decelerations: none  Uterine contractions: none    Prenatal Labs:   Lab Results   Component Value Date/Time    ABORH B POSITIVE 2020 10:51 AM    RUBELLAEXT immune 2022 05:07 PM    HBSAGEXT negative 2022 05:07 PM    HIVEXT non reactive 2022 05:07 PM    RPREXT immune 2022 05:07 PM    GONNOEXT negative 2022 01:17 PM    CHLAMEXT negative 2022 01:17 PM         Assessment/Plan: 28 yo  at 33+6 wks with alvin frederick contractions vs  ctxs. Cervix is closed. FFN was collected but discarded following cervical exam. I reviewed and interpreted the NST as a category 1 tracing. Principal Problem:     uterine contractions in third trimester, antepartum  Resolved Problems:    * No resolved hospital problems.  *       Plan:   Discharge to home  OB follow up as scheduled  Routine OB precautions

## 2022-07-26 ENCOUNTER — HOSPITAL ENCOUNTER (OUTPATIENT)
Dept: INFUSION THERAPY | Age: 32
Discharge: HOME | End: 2022-07-26
Payer: MEDICAID

## 2022-07-26 VITALS
HEART RATE: 92 BPM | DIASTOLIC BLOOD PRESSURE: 77 MMHG | TEMPERATURE: 97.6 F | OXYGEN SATURATION: 96 % | RESPIRATION RATE: 16 BRPM | SYSTOLIC BLOOD PRESSURE: 120 MMHG

## 2022-07-26 DIAGNOSIS — E61.1 IRON DEFICIENCY: Primary | ICD-10-CM

## 2022-07-26 DIAGNOSIS — O99.013 ANEMIA IN PREGNANCY, THIRD TRIMESTER: ICD-10-CM

## 2022-07-26 PROCEDURE — 6360000002 HC RX W HCPCS: Performed by: PEDIATRICS

## 2022-07-26 PROCEDURE — 96375 TX/PRO/DX INJ NEW DRUG ADDON: CPT

## 2022-07-26 PROCEDURE — 2580000003 HC RX 258: Performed by: PEDIATRICS

## 2022-07-26 PROCEDURE — 6370000000 HC RX 637 (ALT 250 FOR IP): Performed by: PEDIATRICS

## 2022-07-26 PROCEDURE — 96366 THER/PROPH/DIAG IV INF ADDON: CPT

## 2022-07-26 PROCEDURE — 96365 THER/PROPH/DIAG IV INF INIT: CPT

## 2022-07-26 RX ORDER — ONDANSETRON 2 MG/ML
8 INJECTION INTRAMUSCULAR; INTRAVENOUS
Status: ACTIVE | OUTPATIENT
Start: 2022-07-26 | End: 2022-07-26

## 2022-07-26 RX ORDER — ONDANSETRON 2 MG/ML
8 INJECTION INTRAMUSCULAR; INTRAVENOUS
OUTPATIENT
Start: 2022-07-29

## 2022-07-26 RX ORDER — EPINEPHRINE 1 MG/ML
0.3 INJECTION, SOLUTION, CONCENTRATE INTRAVENOUS PRN
Status: DISCONTINUED | OUTPATIENT
Start: 2022-07-26 | End: 2022-07-27 | Stop reason: HOSPADM

## 2022-07-26 RX ORDER — ALBUTEROL SULFATE 90 UG/1
4 AEROSOL, METERED RESPIRATORY (INHALATION) PRN
Status: DISCONTINUED | OUTPATIENT
Start: 2022-07-26 | End: 2022-07-27 | Stop reason: HOSPADM

## 2022-07-26 RX ORDER — SODIUM CHLORIDE 9 MG/ML
INJECTION, SOLUTION INTRAVENOUS CONTINUOUS
Status: DISCONTINUED | OUTPATIENT
Start: 2022-07-26 | End: 2022-07-27 | Stop reason: HOSPADM

## 2022-07-26 RX ORDER — SODIUM CHLORIDE 0.9 % (FLUSH) 0.9 %
5-40 SYRINGE (ML) INJECTION PRN
OUTPATIENT
Start: 2022-07-29

## 2022-07-26 RX ORDER — DIPHENHYDRAMINE HYDROCHLORIDE 50 MG/ML
25 INJECTION INTRAMUSCULAR; INTRAVENOUS ONCE
Status: COMPLETED | OUTPATIENT
Start: 2022-07-26 | End: 2022-07-26

## 2022-07-26 RX ORDER — DIPHENHYDRAMINE HYDROCHLORIDE 50 MG/ML
50 INJECTION INTRAMUSCULAR; INTRAVENOUS
OUTPATIENT
Start: 2022-07-29

## 2022-07-26 RX ORDER — MEPERIDINE HYDROCHLORIDE 25 MG/ML
12.5 INJECTION INTRAMUSCULAR; INTRAVENOUS; SUBCUTANEOUS PRN
OUTPATIENT
Start: 2022-07-29

## 2022-07-26 RX ORDER — ACETAMINOPHEN 325 MG/1
650 TABLET ORAL
OUTPATIENT
Start: 2022-07-29

## 2022-07-26 RX ORDER — DIPHENHYDRAMINE HYDROCHLORIDE 50 MG/ML
50 INJECTION INTRAMUSCULAR; INTRAVENOUS
Status: ACTIVE | OUTPATIENT
Start: 2022-07-26 | End: 2022-07-26

## 2022-07-26 RX ORDER — DIPHENHYDRAMINE HYDROCHLORIDE 50 MG/ML
25 INJECTION INTRAMUSCULAR; INTRAVENOUS ONCE
Status: CANCELLED | OUTPATIENT
Start: 2022-07-29 | End: 2022-07-29

## 2022-07-26 RX ORDER — SODIUM CHLORIDE 9 MG/ML
INJECTION, SOLUTION INTRAVENOUS CONTINUOUS
Status: CANCELLED | OUTPATIENT
Start: 2022-07-29

## 2022-07-26 RX ORDER — ACETAMINOPHEN 325 MG/1
650 TABLET ORAL
Status: ACTIVE | OUTPATIENT
Start: 2022-07-26 | End: 2022-07-26

## 2022-07-26 RX ORDER — ALBUTEROL SULFATE 90 UG/1
4 AEROSOL, METERED RESPIRATORY (INHALATION) PRN
OUTPATIENT
Start: 2022-07-29

## 2022-07-26 RX ORDER — MEPERIDINE HYDROCHLORIDE 25 MG/ML
12.5 INJECTION INTRAMUSCULAR; INTRAVENOUS; SUBCUTANEOUS PRN
Status: DISCONTINUED | OUTPATIENT
Start: 2022-07-26 | End: 2022-07-27 | Stop reason: HOSPADM

## 2022-07-26 RX ORDER — SODIUM CHLORIDE 9 MG/ML
5-250 INJECTION, SOLUTION INTRAVENOUS PRN
Status: DISCONTINUED | OUTPATIENT
Start: 2022-07-26 | End: 2022-07-27 | Stop reason: HOSPADM

## 2022-07-26 RX ORDER — EPINEPHRINE 1 MG/ML
0.3 INJECTION, SOLUTION, CONCENTRATE INTRAVENOUS PRN
OUTPATIENT
Start: 2022-07-29

## 2022-07-26 RX ORDER — ACETAMINOPHEN 325 MG/1
650 TABLET ORAL ONCE
Status: COMPLETED | OUTPATIENT
Start: 2022-07-26 | End: 2022-07-26

## 2022-07-26 RX ORDER — HEPARIN SODIUM (PORCINE) LOCK FLUSH IV SOLN 100 UNIT/ML 100 UNIT/ML
500 SOLUTION INTRAVENOUS PRN
Status: DISCONTINUED | OUTPATIENT
Start: 2022-07-26 | End: 2022-07-27 | Stop reason: HOSPADM

## 2022-07-26 RX ORDER — ACETAMINOPHEN 325 MG/1
650 TABLET ORAL ONCE
Status: CANCELLED | OUTPATIENT
Start: 2022-07-29 | End: 2022-07-29

## 2022-07-26 RX ORDER — HEPARIN SODIUM (PORCINE) LOCK FLUSH IV SOLN 100 UNIT/ML 100 UNIT/ML
500 SOLUTION INTRAVENOUS PRN
OUTPATIENT
Start: 2022-07-29

## 2022-07-26 RX ORDER — SODIUM CHLORIDE 9 MG/ML
5-250 INJECTION, SOLUTION INTRAVENOUS PRN
OUTPATIENT
Start: 2022-07-29

## 2022-07-26 RX ORDER — SODIUM CHLORIDE 9 MG/ML
INJECTION, SOLUTION INTRAVENOUS CONTINUOUS
OUTPATIENT
Start: 2022-07-29

## 2022-07-26 RX ORDER — SODIUM CHLORIDE 0.9 % (FLUSH) 0.9 %
5-40 SYRINGE (ML) INJECTION PRN
Status: DISCONTINUED | OUTPATIENT
Start: 2022-07-26 | End: 2022-07-27 | Stop reason: HOSPADM

## 2022-07-26 RX ADMIN — IRON SUCROSE 300 MG: 20 INJECTION, SOLUTION INTRAVENOUS at 15:21

## 2022-07-26 RX ADMIN — SODIUM CHLORIDE, PRESERVATIVE FREE 10 ML: 5 INJECTION INTRAVENOUS at 14:41

## 2022-07-26 RX ADMIN — ACETAMINOPHEN 650 MG: 325 TABLET ORAL at 14:51

## 2022-07-26 RX ADMIN — SODIUM CHLORIDE: 9 INJECTION, SOLUTION INTRAVENOUS at 14:42

## 2022-07-26 RX ADMIN — DIPHENHYDRAMINE HYDROCHLORIDE 25 MG: 50 INJECTION, SOLUTION INTRAMUSCULAR; INTRAVENOUS at 14:50

## 2022-07-26 NOTE — PROGRESS NOTES
Arrived to the [facility]. Colleen completed. Patient tolerated well. Any issues or concerns during appointment: none. Patient aware of next infusion appointment on 7/29/22 (date) at 2:30 pm (time). Patient instructed to call provider with temperature of 100.4 or greater or nausea/vomiting/ diarrhea or pain not controlled by medications  Discharged amblatory.

## 2022-07-28 ENCOUNTER — ROUTINE PRENATAL (OUTPATIENT)
Dept: OBGYN CLINIC | Age: 32
End: 2022-07-28
Payer: MEDICAID

## 2022-07-28 VITALS
DIASTOLIC BLOOD PRESSURE: 60 MMHG | SYSTOLIC BLOOD PRESSURE: 108 MMHG | WEIGHT: 166.8 LBS | BODY MASS INDEX: 29.55 KG/M2 | HEIGHT: 63 IN

## 2022-07-28 DIAGNOSIS — O09.93 SUPERVISION OF HIGH RISK PREGNANCY IN THIRD TRIMESTER: ICD-10-CM

## 2022-07-28 DIAGNOSIS — O98.511 COVID-19 AFFECTING PREGNANCY IN FIRST TRIMESTER: ICD-10-CM

## 2022-07-28 DIAGNOSIS — O24.410 DIET CONTROLLED GESTATIONAL DIABETES MELLITUS (GDM) IN THIRD TRIMESTER: Primary | ICD-10-CM

## 2022-07-28 DIAGNOSIS — Z13.89 SCREENING FOR GENITOURINARY CONDITION: ICD-10-CM

## 2022-07-28 DIAGNOSIS — Z3A.34 34 WEEKS GESTATION OF PREGNANCY: ICD-10-CM

## 2022-07-28 DIAGNOSIS — O10.919 CHRONIC HYPERTENSION DURING PREGNANCY: ICD-10-CM

## 2022-07-28 DIAGNOSIS — U07.1 COVID-19 AFFECTING PREGNANCY IN FIRST TRIMESTER: ICD-10-CM

## 2022-07-28 LAB
GLUCOSE URINE, POC: NEGATIVE
PROTEIN,URINE, POC: NORMAL

## 2022-07-28 PROCEDURE — 99213 OFFICE O/P EST LOW 20 MIN: CPT | Performed by: OBSTETRICS & GYNECOLOGY

## 2022-07-28 PROCEDURE — 76819 FETAL BIOPHYS PROFIL W/O NST: CPT | Performed by: OBSTETRICS & GYNECOLOGY

## 2022-07-28 PROCEDURE — 81002 URINALYSIS NONAUTO W/O SCOPE: CPT | Performed by: OBSTETRICS & GYNECOLOGY

## 2022-07-29 ENCOUNTER — HOSPITAL ENCOUNTER (OUTPATIENT)
Dept: INFUSION THERAPY | Age: 32
Discharge: HOME | End: 2022-07-29
Payer: MEDICAID

## 2022-07-29 VITALS
HEART RATE: 91 BPM | RESPIRATION RATE: 18 BRPM | DIASTOLIC BLOOD PRESSURE: 53 MMHG | BODY MASS INDEX: 29.48 KG/M2 | SYSTOLIC BLOOD PRESSURE: 115 MMHG | TEMPERATURE: 97.8 F | WEIGHT: 166.4 LBS

## 2022-07-29 DIAGNOSIS — E61.1 IRON DEFICIENCY: Primary | ICD-10-CM

## 2022-07-29 DIAGNOSIS — O99.013 ANEMIA IN PREGNANCY, THIRD TRIMESTER: ICD-10-CM

## 2022-07-29 PROCEDURE — 6360000002 HC RX W HCPCS: Performed by: PEDIATRICS

## 2022-07-29 PROCEDURE — 96365 THER/PROPH/DIAG IV INF INIT: CPT

## 2022-07-29 PROCEDURE — 2580000003 HC RX 258: Performed by: PEDIATRICS

## 2022-07-29 PROCEDURE — 6370000000 HC RX 637 (ALT 250 FOR IP): Performed by: PEDIATRICS

## 2022-07-29 PROCEDURE — 96375 TX/PRO/DX INJ NEW DRUG ADDON: CPT

## 2022-07-29 RX ORDER — SODIUM CHLORIDE 0.9 % (FLUSH) 0.9 %
5-40 SYRINGE (ML) INJECTION PRN
OUTPATIENT
Start: 2022-07-29

## 2022-07-29 RX ORDER — MEPERIDINE HYDROCHLORIDE 25 MG/ML
12.5 INJECTION INTRAMUSCULAR; INTRAVENOUS; SUBCUTANEOUS PRN
OUTPATIENT
Start: 2022-07-29

## 2022-07-29 RX ORDER — ACETAMINOPHEN 325 MG/1
650 TABLET ORAL ONCE
Status: COMPLETED | OUTPATIENT
Start: 2022-07-29 | End: 2022-07-29

## 2022-07-29 RX ORDER — SODIUM CHLORIDE 9 MG/ML
5-250 INJECTION, SOLUTION INTRAVENOUS PRN
OUTPATIENT
Start: 2022-07-29

## 2022-07-29 RX ORDER — DIPHENHYDRAMINE HYDROCHLORIDE 50 MG/ML
25 INJECTION INTRAMUSCULAR; INTRAVENOUS ONCE
Status: COMPLETED | OUTPATIENT
Start: 2022-07-29 | End: 2022-07-29

## 2022-07-29 RX ORDER — DIPHENHYDRAMINE HYDROCHLORIDE 50 MG/ML
50 INJECTION INTRAMUSCULAR; INTRAVENOUS
OUTPATIENT
Start: 2022-07-29

## 2022-07-29 RX ORDER — SODIUM CHLORIDE 9 MG/ML
INJECTION, SOLUTION INTRAVENOUS CONTINUOUS
Status: CANCELLED | OUTPATIENT
Start: 2022-07-29

## 2022-07-29 RX ORDER — ACETAMINOPHEN 325 MG/1
650 TABLET ORAL ONCE
Status: CANCELLED | OUTPATIENT
Start: 2022-07-29 | End: 2022-07-29

## 2022-07-29 RX ORDER — SODIUM CHLORIDE 9 MG/ML
INJECTION, SOLUTION INTRAVENOUS CONTINUOUS
OUTPATIENT
Start: 2022-07-29

## 2022-07-29 RX ORDER — HEPARIN SODIUM (PORCINE) LOCK FLUSH IV SOLN 100 UNIT/ML 100 UNIT/ML
500 SOLUTION INTRAVENOUS PRN
OUTPATIENT
Start: 2022-07-29

## 2022-07-29 RX ORDER — SODIUM CHLORIDE 9 MG/ML
INJECTION, SOLUTION INTRAVENOUS CONTINUOUS
Status: DISCONTINUED | OUTPATIENT
Start: 2022-07-29 | End: 2022-07-30 | Stop reason: HOSPADM

## 2022-07-29 RX ORDER — ALBUTEROL SULFATE 90 UG/1
4 AEROSOL, METERED RESPIRATORY (INHALATION) PRN
OUTPATIENT
Start: 2022-07-29

## 2022-07-29 RX ORDER — ACETAMINOPHEN 325 MG/1
650 TABLET ORAL
OUTPATIENT
Start: 2022-07-29

## 2022-07-29 RX ORDER — ONDANSETRON 2 MG/ML
8 INJECTION INTRAMUSCULAR; INTRAVENOUS
OUTPATIENT
Start: 2022-07-29

## 2022-07-29 RX ORDER — EPINEPHRINE 1 MG/ML
0.3 INJECTION, SOLUTION, CONCENTRATE INTRAVENOUS PRN
OUTPATIENT
Start: 2022-07-29

## 2022-07-29 RX ORDER — DIPHENHYDRAMINE HYDROCHLORIDE 50 MG/ML
25 INJECTION INTRAMUSCULAR; INTRAVENOUS ONCE
Status: CANCELLED | OUTPATIENT
Start: 2022-07-29 | End: 2022-07-29

## 2022-07-29 RX ADMIN — ACETAMINOPHEN 650 MG: 325 TABLET ORAL at 14:48

## 2022-07-29 RX ADMIN — IRON SUCROSE 300 MG: 20 INJECTION, SOLUTION INTRAVENOUS at 15:13

## 2022-07-29 RX ADMIN — SODIUM CHLORIDE: 9 INJECTION, SOLUTION INTRAVENOUS at 14:38

## 2022-07-29 RX ADMIN — DIPHENHYDRAMINE HYDROCHLORIDE 25 MG: 50 INJECTION, SOLUTION INTRAMUSCULAR; INTRAVENOUS at 14:49

## 2022-07-29 NOTE — PROGRESS NOTES
Arrived to the [facility]. Iron completed. Patient tolerated well. Observed patient x 30 minutes, no reactions  Any issues or concerns during appointment: None. Patient aware no future infusion appointments. Patient to follow up with physician. Patient aware of next lab and [facility] office visit on 1/12 (date) at 12 (time). Patient instructed to call provider with temperature of 100.4 or greater or nausea/vomiting/ diarrhea or pain not controlled by medications  Discharged ambulatory in stable condition.

## 2022-08-01 ENCOUNTER — ROUTINE PRENATAL (OUTPATIENT)
Dept: OBGYN CLINIC | Age: 32
End: 2022-08-01
Payer: MEDICAID

## 2022-08-01 VITALS
DIASTOLIC BLOOD PRESSURE: 80 MMHG | WEIGHT: 168.6 LBS | SYSTOLIC BLOOD PRESSURE: 124 MMHG | HEIGHT: 63 IN | BODY MASS INDEX: 29.88 KG/M2

## 2022-08-01 DIAGNOSIS — O24.410 DIET CONTROLLED GESTATIONAL DIABETES MELLITUS (GDM) IN THIRD TRIMESTER: Primary | ICD-10-CM

## 2022-08-01 DIAGNOSIS — Z3A.35 35 WEEKS GESTATION OF PREGNANCY: ICD-10-CM

## 2022-08-01 DIAGNOSIS — Z13.89 SCREENING FOR GENITOURINARY CONDITION: ICD-10-CM

## 2022-08-01 DIAGNOSIS — O09.93 SUPERVISION OF HIGH RISK PREGNANCY IN THIRD TRIMESTER: ICD-10-CM

## 2022-08-01 LAB
GLUCOSE URINE, POC: NEGATIVE
PROTEIN,URINE, POC: NORMAL

## 2022-08-01 PROCEDURE — 59025 FETAL NON-STRESS TEST: CPT | Performed by: OBSTETRICS & GYNECOLOGY

## 2022-08-01 PROCEDURE — 99213 OFFICE O/P EST LOW 20 MIN: CPT | Performed by: OBSTETRICS & GYNECOLOGY

## 2022-08-01 PROCEDURE — 81002 URINALYSIS NONAUTO W/O SCOPE: CPT | Performed by: OBSTETRICS & GYNECOLOGY

## 2022-08-01 NOTE — PROGRESS NOTES
On labetalol bid -  tid. A1dm - all sugars less than 140. ptl precautions and kick counts. Seeing mfm Thursday. rtc here next week.   Reactive nst.  gbs next visit

## 2022-08-04 ENCOUNTER — ROUTINE PRENATAL (OUTPATIENT)
Dept: OBGYN CLINIC | Age: 32
End: 2022-08-04
Payer: MEDICAID

## 2022-08-04 VITALS — DIASTOLIC BLOOD PRESSURE: 81 MMHG | SYSTOLIC BLOOD PRESSURE: 133 MMHG

## 2022-08-04 DIAGNOSIS — O09.93 HIGH-RISK PREGNANCY IN THIRD TRIMESTER: ICD-10-CM

## 2022-08-04 DIAGNOSIS — O24.410 DIET CONTROLLED GESTATIONAL DIABETES MELLITUS (GDM) IN THIRD TRIMESTER: ICD-10-CM

## 2022-08-04 DIAGNOSIS — Z82.79 FAMILY HISTORY OF DOWN SYNDROME: ICD-10-CM

## 2022-08-04 DIAGNOSIS — O10.919 CHRONIC HYPERTENSION DURING PREGNANCY: ICD-10-CM

## 2022-08-04 DIAGNOSIS — O99.013 ANEMIA IN PREGNANCY, THIRD TRIMESTER: ICD-10-CM

## 2022-08-04 DIAGNOSIS — Z3A.35 35 WEEKS GESTATION OF PREGNANCY: Primary | ICD-10-CM

## 2022-08-04 DIAGNOSIS — U07.1 COVID-19 AFFECTING PREGNANCY IN FIRST TRIMESTER: ICD-10-CM

## 2022-08-04 DIAGNOSIS — O98.511 COVID-19 AFFECTING PREGNANCY IN FIRST TRIMESTER: ICD-10-CM

## 2022-08-04 DIAGNOSIS — O09.293 HX OF PREECLAMPSIA, PRIOR PREGNANCY, CURRENTLY PREGNANT, THIRD TRIMESTER: ICD-10-CM

## 2022-08-04 PROCEDURE — 76816 OB US FOLLOW-UP PER FETUS: CPT | Performed by: OBSTETRICS & GYNECOLOGY

## 2022-08-04 PROCEDURE — 76819 FETAL BIOPHYS PROFIL W/O NST: CPT | Performed by: OBSTETRICS & GYNECOLOGY

## 2022-08-04 PROCEDURE — 99215 OFFICE O/P EST HI 40 MIN: CPT | Performed by: OBSTETRICS & GYNECOLOGY

## 2022-08-04 ASSESSMENT — PATIENT HEALTH QUESTIONNAIRE - PHQ9
SUM OF ALL RESPONSES TO PHQ QUESTIONS 1-9: 0
1. LITTLE INTEREST OR PLEASURE IN DOING THINGS: 0
SUM OF ALL RESPONSES TO PHQ QUESTIONS 1-9: 0
SUM OF ALL RESPONSES TO PHQ9 QUESTIONS 1 & 2: 0
SUM OF ALL RESPONSES TO PHQ QUESTIONS 1-9: 0
2. FEELING DOWN, DEPRESSED OR HOPELESS: 0
SUM OF ALL RESPONSES TO PHQ QUESTIONS 1-9: 0

## 2022-08-04 NOTE — PROGRESS NOTES
UA 2.9, 24 hr urine 191.    22 at [facility]: BP mildly elevated today (137/92). 22 at [facility]: Pt taking Labetalol 200mg TID. Denies PreE symptoms. BP remains WNL. PreE labs from  normal.    07/15/22 at [facility]: Taking Labetalol 200 mg TID. BP /81  22 at [facility]: Slight elevated, pt doesn't take as prescribed, states she checks her BP before taking med and was told by OB if it was low not to take her medication. Advised to take 1/2 tablet if BP is low since she needs to keep medication in her system. Repeat preeclamptic labs in your office if elevated BP or symptoms (CBC, CMP, LDH, Uric Acid); also do P/C ratio (if elevated,  need to then confirm with 24h urine) or 24 hour urine (for total protein and creatinine clearance)  Increase Labetalol if BP >140/90  PreE Warnings given. Twice weekly testing @ OB office.  uterine contractions in third trimester, antepartum 2022     Priority: Medium    Iron deficiency 2022     Priority: Medium    Diet controlled gestational diabetes mellitus (GDM) in third trimester 2022     Priority: Medium     2022 at [facility]: Explained the complications of  pulmonary immaturity, stillbirth, shoulder dystocia and fetal hypoglycemia associated with lack of BG compliance. Discussed the long-term impact of GDM on maternal and child health reviewed including maternal DM 2; obesity as child/adult and metabolic syndrome. Risks mitigated by appropriate fetal growth, as well as appropriate dietary and lifestyle choices and breastfeeding for at least 6 months. Log reviewed, most readings WNL. Spoke in length with patient regarding low-carb diet, and meal options. Reviewed carb-counting, regular meal times and snacks. Reinforced recommended carb load ranges per meal; breakfast 15-30 grams and lunch and dinner 30-45 grams. Reviewed and reinforced low carb options with patient. Reminded patient to send in weekly logs. Diet discussed in length and all questions answered. Continue with diet control at this time. Elevated Parameters. Recommend QID BG testing and send logs weekly to OB and OUR office. Continue diet therapy. Adjust medication if elevations become consistent. 07/15/22 at [facility]: Glucose log reviewed. BS ranges 70 to 141. FBS ranges 70-86   PP BS ranges 129-141     Overall good control. 7/25/22 Glucose log reviewed. BS ranges 69 to 156. FBS ranges WNL. PP BS ranges good control. Elevated parameters. Continue diet control. 08/04/22 at [facility]: Log reviewed, most WNL, can reduce to BID daily testing with elevated parameters. Continue with diet control      Anemia in pregnancy, third trimester 06/20/2022     Priority: Medium     6/15/22-HGB-8.9-start daily iron. 07/13/22 at [facility]: Seeing Hematology for iron infusions. 07/15/22 at [facility]: Appointment for Venofer infusion on 7/19        Hx of preeclampsia, prior pregnancy, currently pregnant, third trimester 03/29/2022     Priority: Low     3/29/2022 [facility]: 37wk; mild range BP. Normotensive by 6wk pp.   3/29/2022 at [facility]: BP normal today. 03/24/22 24 hour urine crea clear 194, total protein 165        Headache in pregnancy, antepartum, third trimester 03/29/2022     Priority: Low     3/29/2022 [facility]: bilateral frontal/parietal. No hx migraines. Initially was noted to coincide with HTN, now normotensive on meds and not coinciding with BP swings. Sx'c care reviewed. Given via [address]  06/14/22 at [facility]: no change to headaches, stable. 07/13/22 at [facility]: c/o occasional Has  07/15/22 at [facility]: occasional headaches twice a week, stable. Resolved with tylenol    Consideration of these OTC options (tylenol, caffeine, hydration, benadryl, mag oxide up to 800mg BID, riboflavin 400mg daily; madhu-relief, excedrin migraine). Other non-pharm solutions - BeKool-type head patches, essential oils, dark room, warm compresses.     If fails all OTC, then reglan/benadryl combo or imitrex/triptans recommended. Opioid, including fiorcet, may be considered if fails all non-opiate medications. But these are not recommended for use due to rebound headaches and  withdrawal concerns. Ergotamines are not recommended in pregnancy        COVID-19 affecting pregnancy in first trimester 2022     Priority: Low     Tested positive - early first trimester  Vax x2  3/29/2022 at [facility]: no residual symptoms. No sonographic findings c/w placentitis  · No further testing needed        Family history of Down syndrome 2020     Priority: Low     FOB 1st Maternal Cousin  NIPT Neg            Addressed normal pregnancy complaints, reassured and offered suggestions for care  Reviewed gestational age precautions and activity goals/limitations  Nutritional counseling as well as specific goals based on current maternal and fetal status  Options for GERD therapy if becomes symptomatic over course of pregnancy  Gestational age appropriate preventive care regarding communicable disease transmission and vaccines as appropriate (including flu, TDaP, and COVID.)  Additional plans and concerns as documented in problem list.   All questions answered and concerns discussed. An electronic signature was used to authenticate this note. Fredi Denson MD    I have spent 40 minutes reviewing previous notes, test results and face to face with the patient discussing the diagnosis and importance of compliance with the treatment plan, in addition to ultrasound findings, as well as documenting on the day of the visit (2022).     Return in 1 week(s)  BPP      Patient Active Problem List   Diagnosis Code    Family history of Down syndrome Z82.79    COVID-19 affecting pregnancy in first trimester O98.511, U07.1    High-risk pregnancy in third trimester O09.93    Chronic hypertension during pregnancy O10.919    Hx of preeclampsia, prior pregnancy, currently pregnant, third trimester O09.293 Headache in pregnancy, antepartum, third trimester O26.893, R51.9    Anemia in pregnancy, third trimester O99.013    Diet controlled gestational diabetes mellitus (GDM) in third trimester O24.410    Iron deficiency E61.1     uterine contractions in third trimester, antepartum O47.03

## 2022-08-08 ENCOUNTER — ROUTINE PRENATAL (OUTPATIENT)
Dept: OBGYN CLINIC | Age: 32
End: 2022-08-08
Payer: MEDICAID

## 2022-08-08 VITALS — WEIGHT: 169.2 LBS | DIASTOLIC BLOOD PRESSURE: 72 MMHG | BODY MASS INDEX: 29.97 KG/M2 | SYSTOLIC BLOOD PRESSURE: 130 MMHG

## 2022-08-08 DIAGNOSIS — Z13.89 SCREENING FOR GENITOURINARY CONDITION: ICD-10-CM

## 2022-08-08 DIAGNOSIS — O10.919 CHRONIC HYPERTENSION AFFECTING PREGNANCY: ICD-10-CM

## 2022-08-08 DIAGNOSIS — O24.410 DIET CONTROLLED GESTATIONAL DIABETES MELLITUS (GDM) IN THIRD TRIMESTER: ICD-10-CM

## 2022-08-08 DIAGNOSIS — Z36.85 ANTENATAL SCREENING FOR STREPTOCOCCUS B: ICD-10-CM

## 2022-08-08 DIAGNOSIS — O09.293 HISTORY OF PRE-ECLAMPSIA IN PRIOR PREGNANCY, CURRENTLY PREGNANT, THIRD TRIMESTER: ICD-10-CM

## 2022-08-08 DIAGNOSIS — Z3A.36 36 WEEKS GESTATION OF PREGNANCY: ICD-10-CM

## 2022-08-08 DIAGNOSIS — O09.93 HIGH-RISK PREGNANCY IN THIRD TRIMESTER: Primary | ICD-10-CM

## 2022-08-08 LAB
GLUCOSE URINE, POC: NEGATIVE
PROTEIN,URINE, POC: NORMAL

## 2022-08-08 PROCEDURE — 99213 OFFICE O/P EST LOW 20 MIN: CPT | Performed by: OBSTETRICS & GYNECOLOGY

## 2022-08-08 PROCEDURE — 76819 FETAL BIOPHYS PROFIL W/O NST: CPT | Performed by: OBSTETRICS & GYNECOLOGY

## 2022-08-08 NOTE — PROGRESS NOTES
8/8 15cm. AOK. Reassured regarding concerns. Stopped insulin last week, SYED improved. MFM on Thursday.

## 2022-08-11 ENCOUNTER — ROUTINE PRENATAL (OUTPATIENT)
Dept: OBGYN CLINIC | Age: 32
End: 2022-08-11
Payer: MEDICAID

## 2022-08-11 DIAGNOSIS — O98.511 COVID-19 AFFECTING PREGNANCY IN FIRST TRIMESTER: ICD-10-CM

## 2022-08-11 DIAGNOSIS — R51.9 HEADACHE IN PREGNANCY, ANTEPARTUM, THIRD TRIMESTER: ICD-10-CM

## 2022-08-11 DIAGNOSIS — U07.1 COVID-19 AFFECTING PREGNANCY IN FIRST TRIMESTER: ICD-10-CM

## 2022-08-11 DIAGNOSIS — Z3A.36 36 WEEKS GESTATION OF PREGNANCY: Primary | ICD-10-CM

## 2022-08-11 DIAGNOSIS — Z82.79 FAMILY HISTORY OF DOWN SYNDROME: ICD-10-CM

## 2022-08-11 DIAGNOSIS — O09.293 HX OF PREECLAMPSIA, PRIOR PREGNANCY, CURRENTLY PREGNANT, THIRD TRIMESTER: ICD-10-CM

## 2022-08-11 DIAGNOSIS — O26.893 HEADACHE IN PREGNANCY, ANTEPARTUM, THIRD TRIMESTER: ICD-10-CM

## 2022-08-11 DIAGNOSIS — O99.013 ANEMIA IN PREGNANCY, THIRD TRIMESTER: ICD-10-CM

## 2022-08-11 DIAGNOSIS — O09.93 HIGH-RISK PREGNANCY IN THIRD TRIMESTER: ICD-10-CM

## 2022-08-11 DIAGNOSIS — O24.410 DIET CONTROLLED GESTATIONAL DIABETES MELLITUS (GDM) IN THIRD TRIMESTER: ICD-10-CM

## 2022-08-11 DIAGNOSIS — O10.919 CHRONIC HYPERTENSION DURING PREGNANCY: ICD-10-CM

## 2022-08-11 PROCEDURE — 76819 FETAL BIOPHYS PROFIL W/O NST: CPT | Performed by: OBSTETRICS & GYNECOLOGY

## 2022-08-11 PROCEDURE — 76815 OB US LIMITED FETUS(S): CPT | Performed by: OBSTETRICS & GYNECOLOGY

## 2022-08-11 PROCEDURE — 99215 OFFICE O/P EST HI 40 MIN: CPT | Performed by: OBSTETRICS & GYNECOLOGY

## 2022-08-11 PROCEDURE — 76820 UMBILICAL ARTERY ECHO: CPT | Performed by: OBSTETRICS & GYNECOLOGY

## 2022-08-11 NOTE — PATIENT INSTRUCTIONS
Please remember to send your glucose log weekly to [facility] either through [facility] or to [email or web address]. It helps us better manage your gestational diabetes and overall care for you and your baby!

## 2022-08-11 NOTE — PROGRESS NOTES
MFM Consultation    Jenny Aguirre (: 1990) is a 29 y.o. P3P0182 at 36w3d with 2022, by Ultrasound. 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 Scheduled to see primary OB ([facility]) on 8/15/2022 (twice weekly testing). Has occasional headaches, managed with Tylenol/caffeine. Reports swelling in feet, trace seen today. Denies preeclamptic symptoms. Reports good fetal movement. No bleeding, LOF, or cramping. Reports Ovid Platt contractions and vaginal pressure. No HAs, edema. Denies preeclamptic symptoms. Reports good fetal movement. No bleeding, LOF, cramping, ctxs, or vaginal pressure. Interval history since prior appt reviewed and updated as indicated. Review of Systems - per HPI; otherwise unremarkable. Exam:   There were no vitals filed for this visit. Ultrasound: Please see formal ultrasound report under imaging tab. ASSESSMENT/PLAN:  Patient Active Problem List    Diagnosis Date Noted    High-risk pregnancy in third trimester 2022     Priority: High     Genetics- NIPT negative x 3  Glucola-174 (22) needs 3 hour    22 at [facility]: Appropriate fetal growth; AC 26%, however low normal SYED= 9 cm, Dopplers WNL, BPP 8/8. Talked to patient about increasing fluids, frequent snacks. Focus on fetal movement counts. 07/15/22 at [facility]: Improved SYED 12.8,  BPP 8/8, S/D 2.73. Very active fetus, no complaints. 2022 at [facility]: Appropriate growth; decrease in SYED; AC 40%, Overall 36%, SYED 8.8 cm (DVP 5.9 cm), UA Dopplers WNL, BPP 8/8. Concerned about uteroplacental insufficiency with decreasing SYED. Will stop insulin and increase calories and see back in 1 week. 2022 at [facility]: Reassuring fetal status, BPP 8/8; Improved SYED 14.9 cm, UA Dopplers WNL. Patient reports good FM.   Due to multiple episodes of decreased SYED<10 cm. Would induce at 39 weeks of if SYED drops again. FMKs stressed. Induce at 39 weeks; sooner if SYED at 5 cm or less; sooner for maternal/fetal concerns  Continue twice weekly testing with primary OB; at least one is BPP  No follow-up with [facility], refer back PRN          Chronic hypertension during pregnancy 2022     Priority: High     GHTN and pp preeclampsia with G3  3/22/22 PreE labs WNL, UA 2.9, 24 hr urine 191.    22 at [facility]: Slight elevated, pt doesn't take as prescribed, states she checks her BP before taking med and was told by OB if it was low not to take her medication. Advised to take 1/2 tablet if BP is low since she needs to keep medication in her system. 2022 at [facility]: BP WNL    Repeat preeclamptic labs in your office if elevated BP or symptoms (CBC, CMP, LDH, Uric Acid); also do P/C ratio (if elevated,  need to then confirm with 24h urine) or 24 hour urine (for total protein and creatinine clearance)  Increase Labetalol if BP >140/90  PreE Warnings given. Twice weekly testing, MFM every Thursday, Monday @ OB office.  uterine contractions in third trimester, antepartum 2022     Priority: Medium    Iron deficiency 2022     Priority: Medium    Diet controlled gestational diabetes mellitus (GDM) in third trimester 2022     Priority: Medium     2022 at [facility]: Explained the complications of  pulmonary immaturity, stillbirth, shoulder dystocia and fetal hypoglycemia associated with lack of BG compliance. Discussed the long-term impact of GDM on maternal and child health reviewed including maternal DM 2; obesity as child/adult and metabolic syndrome. Risks mitigated by appropriate fetal growth, as well as appropriate dietary and lifestyle choices and breastfeeding for at least 6 months. Log reviewed, most readings WNL. Spoke in length with patient regarding low-carb diet, and meal options.  Reviewed carb-counting, regular meal times and snacks. Reinforced recommended carb load ranges per meal; breakfast 15-30 grams and lunch and dinner 30-45 grams. Reviewed and reinforced low carb options with patient. Reminded patient to send in weekly logs. Diet discussed in length and all questions answered. Continue with diet control at this time. Elevated Parameters. Recommend QID BG testing and send logs weekly to OB and OUR office. Continue diet therapy. Adjust medication if elevations become consistent. 07/15/22 at [facility]: Glucose log reviewed. BS ranges 70 to 141. FBS ranges 70-86   PP BS ranges 129-141     Overall good control. 7/25/22 Glucose log reviewed. BS ranges 69 to 156. FBS ranges WNL. PP BS ranges good control. Elevated parameters. Continue diet control. 08/04/22 at [facility]: Log reviewed, most WNL, can reduce to BID daily testing with elevated parameters. 08/11/22 at [facility]: Log reviewed, all WNL, continue BID daily testing with elevated parameters. Continue with diet control      Anemia in pregnancy, third trimester 06/20/2022     Priority: Medium     6/15/22-HGB-8.9-start daily iron. 07/13/22 at [facility]: Seeing Hematology for iron infusions. 07/15/22 at [facility]: Appointment for Venofer infusion on 7/19        Hx of preeclampsia, prior pregnancy, currently pregnant, third trimester 03/29/2022     Priority: Low     3/29/2022 [facility]: 37wk; mild range BP. Normotensive by 6wk pp.   3/29/2022 at [facility]: BP normal today. 03/24/22 24 hour urine crea clear 194, total protein 165  8/11/2022 BP WNL        Headache in pregnancy, antepartum, third trimester 03/29/2022     Priority: Low     3/29/2022 [facility]: bilateral frontal/parietal. No hx migraines. Initially was noted to coincide with HTN, now normotensive on meds and not coinciding with BP swings. Sx'c care reviewed. Given via [address]  06/14/22 at [facility]: no change to headaches, stable.   07/13/22 at [facility]: c/o occasional Has  07/15/22 at [facility]: occasional headaches twice a week, stable. Resolved with tylenol  22 at [facility]: Reports every other day, managed with Tylenol/rest    Consideration of these OTC options (tylenol, caffeine, hydration, benadryl, mag oxide up to 800mg BID, riboflavin 400mg daily; madhu-relief, excedrin migraine). Other non-pharm solutions - BeKool-type head patches, essential oils, dark room, warm compresses. If fails all OTC, then reglan/benadryl combo or imitrex/triptans recommended. Opioid, including fiorcet, may be considered if fails all non-opiate medications. But these are not recommended for use due to rebound headaches and  withdrawal concerns. Ergotamines are not recommended in pregnancy        COVID-19 affecting pregnancy in first trimester 2022     Priority: Low     Tested positive - early first trimester  Vax x2  3/29/2022 at [facility]: no residual symptoms. No sonographic findings c/w placentitis  · No further testing needed        Family history of Down syndrome 2020     Priority: Low     FOB 1st Maternal Cousin  NIPT Neg            Addressed normal pregnancy complaints, reassured and offered suggestions for care  Reviewed gestational age precautions and activity goals/limitations  Nutritional counseling as well as specific goals based on current maternal and fetal status  Options for GERD therapy if becomes symptomatic over course of pregnancy  Gestational age appropriate preventive care regarding communicable disease transmission and vaccines as appropriate (including flu, TDaP, and COVID.)  Additional plans and concerns as documented in problem list.   All questions answered and concerns discussed. An electronic signature was used to authenticate this note.   Anatoliy Barcenas MD    I have spent 40 minutes reviewing previous notes, test results and face to face with the patient discussing the diagnosis and importance of compliance with the treatment plan, in addition to ultrasound findings, as well as documenting on the day of the visit (2022).       Patient Active Problem List   Diagnosis Code    Family history of Down syndrome Z82.79    COVID-19 affecting pregnancy in first trimester O98.511, U07.1    High-risk pregnancy in third trimester O09.93    Chronic hypertension during pregnancy O10.919    Hx of preeclampsia, prior pregnancy, currently pregnant, third trimester O09.293    Headache in pregnancy, antepartum, third trimester O26.893, R51.9    Anemia in pregnancy, third trimester O99.013    Diet controlled gestational diabetes mellitus (GDM) in third trimester O20.18    Iron deficiency E61.1     uterine contractions in third trimester, antepartum O47.03

## 2022-08-12 LAB
BACTERIA SPEC CULT: NORMAL
SERVICE CMNT-IMP: NORMAL

## 2022-08-15 ENCOUNTER — ROUTINE PRENATAL (OUTPATIENT)
Dept: OBGYN CLINIC | Age: 32
End: 2022-08-15
Payer: MEDICAID

## 2022-08-15 VITALS
SYSTOLIC BLOOD PRESSURE: 138 MMHG | DIASTOLIC BLOOD PRESSURE: 100 MMHG | BODY MASS INDEX: 30.19 KG/M2 | WEIGHT: 170.4 LBS

## 2022-08-15 DIAGNOSIS — Z34.83 PRENATAL CARE, SUBSEQUENT PREGNANCY, THIRD TRIMESTER: ICD-10-CM

## 2022-08-15 DIAGNOSIS — O98.511 COVID-19 AFFECTING PREGNANCY IN FIRST TRIMESTER: ICD-10-CM

## 2022-08-15 DIAGNOSIS — O99.013 ANEMIA IN PREGNANCY, THIRD TRIMESTER: ICD-10-CM

## 2022-08-15 DIAGNOSIS — Z13.89 SCREENING FOR GENITOURINARY CONDITION: ICD-10-CM

## 2022-08-15 DIAGNOSIS — O09.293 HX OF PREECLAMPSIA, PRIOR PREGNANCY, CURRENTLY PREGNANT, THIRD TRIMESTER: ICD-10-CM

## 2022-08-15 DIAGNOSIS — O24.410 DIET CONTROLLED GESTATIONAL DIABETES MELLITUS (GDM) IN THIRD TRIMESTER: ICD-10-CM

## 2022-08-15 DIAGNOSIS — O10.919 CHRONIC HYPERTENSION DURING PREGNANCY: ICD-10-CM

## 2022-08-15 DIAGNOSIS — O09.93 HIGH-RISK PREGNANCY IN THIRD TRIMESTER: Primary | ICD-10-CM

## 2022-08-15 DIAGNOSIS — U07.1 COVID-19 AFFECTING PREGNANCY IN FIRST TRIMESTER: ICD-10-CM

## 2022-08-15 DIAGNOSIS — Z3A.37 37 WEEKS GESTATION OF PREGNANCY: ICD-10-CM

## 2022-08-15 LAB
ALBUMIN SERPL-MCNC: 2.7 G/DL (ref 3.5–5)
ALBUMIN/GLOB SERPL: 0.7 {RATIO} (ref 1.2–3.5)
ALP SERPL-CCNC: 144 U/L (ref 50–136)
ALT SERPL-CCNC: 19 U/L (ref 12–65)
ANION GAP SERPL CALC-SCNC: 7 MMOL/L (ref 7–16)
AST SERPL-CCNC: 17 U/L (ref 15–37)
BASOPHILS # BLD: 0 K/UL (ref 0–0.2)
BASOPHILS NFR BLD: 0 % (ref 0–2)
BILIRUB SERPL-MCNC: 0.2 MG/DL (ref 0.2–1.1)
BUN SERPL-MCNC: 6 MG/DL (ref 6–23)
CALCIUM SERPL-MCNC: 8.9 MG/DL (ref 8.3–10.4)
CHLORIDE SERPL-SCNC: 108 MMOL/L (ref 98–107)
CO2 SERPL-SCNC: 22 MMOL/L (ref 21–32)
CREAT SERPL-MCNC: 0.4 MG/DL (ref 0.6–1)
CREAT UR-MCNC: 78 MG/DL
DIFFERENTIAL METHOD BLD: ABNORMAL
EOSINOPHIL # BLD: 0 K/UL (ref 0–0.8)
EOSINOPHIL NFR BLD: 0 % (ref 0.5–7.8)
ERYTHROCYTE [DISTWIDTH] IN BLOOD BY AUTOMATED COUNT: 27.8 % (ref 11.9–14.6)
GLOBULIN SER CALC-MCNC: 3.8 G/DL (ref 2.3–3.5)
GLUCOSE SERPL-MCNC: 74 MG/DL (ref 65–100)
GLUCOSE URINE, POC: NEGATIVE
HCT VFR BLD AUTO: 38.7 % (ref 35.8–46.3)
HGB BLD-MCNC: 11.7 G/DL (ref 11.7–15.4)
IMM GRANULOCYTES # BLD AUTO: 0.2 K/UL (ref 0–0.5)
IMM GRANULOCYTES NFR BLD AUTO: 2 % (ref 0–5)
LDH SERPL L TO P-CCNC: 136 U/L (ref 100–190)
LYMPHOCYTES # BLD: 2.2 K/UL (ref 0.5–4.6)
LYMPHOCYTES NFR BLD: 31 % (ref 13–44)
MCH RBC QN AUTO: 26.6 PG (ref 26.1–32.9)
MCHC RBC AUTO-ENTMCNC: 30.2 G/DL (ref 31.4–35)
MCV RBC AUTO: 88 FL (ref 79.6–97.8)
MONOCYTES # BLD: 0.8 K/UL (ref 0.1–1.3)
MONOCYTES NFR BLD: 11 % (ref 4–12)
NEUTS SEG # BLD: 4 K/UL (ref 1.7–8.2)
NEUTS SEG NFR BLD: 56 % (ref 43–78)
NRBC # BLD: 0 K/UL (ref 0–0.2)
PLATELET # BLD AUTO: 209 K/UL (ref 150–450)
PMV BLD AUTO: 11.2 FL (ref 9.4–12.3)
POTASSIUM SERPL-SCNC: 3.9 MMOL/L (ref 3.5–5.1)
PROT SERPL-MCNC: 6.5 G/DL (ref 6.3–8.2)
PROT UR-MCNC: 37 MG/DL
PROT/CREAT UR-RTO: 0.5
PROTEIN,URINE, POC: NORMAL
RBC # BLD AUTO: 4.4 M/UL (ref 4.05–5.2)
SODIUM SERPL-SCNC: 137 MMOL/L (ref 136–145)
URATE SERPL-MCNC: 2.9 MG/DL (ref 2.6–6)
WBC # BLD AUTO: 7.1 K/UL (ref 4.3–11.1)

## 2022-08-15 PROCEDURE — 99213 OFFICE O/P EST LOW 20 MIN: CPT | Performed by: OBSTETRICS & GYNECOLOGY

## 2022-08-15 PROCEDURE — 76819 FETAL BIOPHYS PROFIL W/O NST: CPT | Performed by: OBSTETRICS & GYNECOLOGY

## 2022-08-15 NOTE — PROGRESS NOTES
8/8, 12.9 PEC precautions, HELLP labs today. Encouraged her to take meds on time, consistently. Recheck Thursday or prn. See RN note.

## 2022-08-15 NOTE — PROGRESS NOTES
Recent vomiting and nausea  Not being consistent with labetalol Did not take medication this morning   C/o pelvic pressure  2nd blood pressure: 138/100  Patient took labetalol in room after 1st blood pressure

## 2022-08-18 ENCOUNTER — ROUTINE PRENATAL (OUTPATIENT)
Dept: OBGYN CLINIC | Age: 32
End: 2022-08-18
Payer: MEDICAID

## 2022-08-18 VITALS
DIASTOLIC BLOOD PRESSURE: 84 MMHG | SYSTOLIC BLOOD PRESSURE: 126 MMHG | WEIGHT: 172 LBS | HEIGHT: 63 IN | BODY MASS INDEX: 30.48 KG/M2

## 2022-08-18 DIAGNOSIS — O98.511 COVID-19 AFFECTING PREGNANCY IN FIRST TRIMESTER: ICD-10-CM

## 2022-08-18 DIAGNOSIS — U07.1 COVID-19 AFFECTING PREGNANCY IN FIRST TRIMESTER: ICD-10-CM

## 2022-08-18 DIAGNOSIS — O09.293 HX OF PREECLAMPSIA, PRIOR PREGNANCY, CURRENTLY PREGNANT, THIRD TRIMESTER: ICD-10-CM

## 2022-08-18 DIAGNOSIS — O09.93 SUPERVISION OF HIGH RISK PREGNANCY IN THIRD TRIMESTER: ICD-10-CM

## 2022-08-18 DIAGNOSIS — O24.410 DIET CONTROLLED GESTATIONAL DIABETES MELLITUS (GDM) IN THIRD TRIMESTER: Primary | ICD-10-CM

## 2022-08-18 DIAGNOSIS — Z13.89 SCREENING FOR GENITOURINARY CONDITION: ICD-10-CM

## 2022-08-18 DIAGNOSIS — Z3A.37 37 WEEKS GESTATION OF PREGNANCY: ICD-10-CM

## 2022-08-18 DIAGNOSIS — O10.919 CHRONIC HYPERTENSION DURING PREGNANCY: ICD-10-CM

## 2022-08-18 LAB
GLUCOSE URINE, POC: NEGATIVE
PROTEIN,URINE, POC: NORMAL

## 2022-08-18 PROCEDURE — 99214 OFFICE O/P EST MOD 30 MIN: CPT | Performed by: OBSTETRICS & GYNECOLOGY

## 2022-08-18 PROCEDURE — 81002 URINALYSIS NONAUTO W/O SCOPE: CPT | Performed by: OBSTETRICS & GYNECOLOGY

## 2022-08-18 PROCEDURE — 76819 FETAL BIOPHYS PROFIL W/O NST: CPT | Performed by: OBSTETRICS & GYNECOLOGY

## 2022-08-18 NOTE — PROGRESS NOTES
BPP 8/8 SYED  11  BP much better today, no PEC sxs. Reviewed PIH labs, all wnl.   States blood sugars are wnl  Discussed labor precautions and delivery at 39 weeks ( 08/29) OR sooner if BP elevates/signs of PEC

## 2022-08-22 ENCOUNTER — ROUTINE PRENATAL (OUTPATIENT)
Dept: OBGYN CLINIC | Age: 32
End: 2022-08-22
Payer: MEDICAID

## 2022-08-22 VITALS
HEIGHT: 63 IN | DIASTOLIC BLOOD PRESSURE: 88 MMHG | BODY MASS INDEX: 30.69 KG/M2 | WEIGHT: 173.2 LBS | SYSTOLIC BLOOD PRESSURE: 128 MMHG

## 2022-08-22 DIAGNOSIS — O09.93 SUPERVISION OF HIGH RISK PREGNANCY IN THIRD TRIMESTER: ICD-10-CM

## 2022-08-22 DIAGNOSIS — O10.919 CHRONIC HYPERTENSION DURING PREGNANCY: ICD-10-CM

## 2022-08-22 DIAGNOSIS — Z3A.38 38 WEEKS GESTATION OF PREGNANCY: ICD-10-CM

## 2022-08-22 DIAGNOSIS — O24.410 DIET CONTROLLED GESTATIONAL DIABETES MELLITUS (GDM) IN THIRD TRIMESTER: Primary | ICD-10-CM

## 2022-08-22 DIAGNOSIS — U07.1 COVID-19 AFFECTING PREGNANCY IN FIRST TRIMESTER: ICD-10-CM

## 2022-08-22 DIAGNOSIS — Z13.89 SCREENING FOR GENITOURINARY CONDITION: ICD-10-CM

## 2022-08-22 DIAGNOSIS — O98.511 COVID-19 AFFECTING PREGNANCY IN FIRST TRIMESTER: ICD-10-CM

## 2022-08-22 DIAGNOSIS — O09.293 HX OF PREECLAMPSIA, PRIOR PREGNANCY, CURRENTLY PREGNANT, THIRD TRIMESTER: ICD-10-CM

## 2022-08-22 PROBLEM — O47.03 PRETERM UTERINE CONTRACTIONS IN THIRD TRIMESTER, ANTEPARTUM: Status: RESOLVED | Noted: 2022-07-24 | Resolved: 2022-08-22

## 2022-08-22 LAB
GLUCOSE URINE, POC: NEGATIVE
PROTEIN,URINE, POC: NORMAL

## 2022-08-22 PROCEDURE — 99213 OFFICE O/P EST LOW 20 MIN: CPT | Performed by: OBSTETRICS & GYNECOLOGY

## 2022-08-22 PROCEDURE — 81002 URINALYSIS NONAUTO W/O SCOPE: CPT | Performed by: OBSTETRICS & GYNECOLOGY

## 2022-08-22 NOTE — PROGRESS NOTES
Pt states that BP has been increasing despite meds, 145/96 this weekend. She states this came down with labetalol but she is still concerned.  She is taking this TID, she has been having headaches, some swelling in left foot, cramping, nausea in AM     Denies bleeding, spotting, vomiting

## 2022-08-22 NOTE — PROGRESS NOTES
BPP 8/8  SYED 15  IOL next week  Factual information regarding the medical condition and the need for induction of labor was discussed with the patient, who verbalized understanding. The therapeutic rationale, benefits, risks and potential complications were discussed, including the possibility of pain, bleeding, infection, loss of function, disfigurement, death or other unforeseen complications. Alternatives to the procedure were discussed (including potential risks, complications and benefits of each). No guarantee was expressed or implied as to the results of the procedure. Informed consent was given, as well as a request to proceed.

## 2022-08-25 ENCOUNTER — ROUTINE PRENATAL (OUTPATIENT)
Dept: OBGYN CLINIC | Age: 32
End: 2022-08-25
Payer: MEDICAID

## 2022-08-25 VITALS
BODY MASS INDEX: 30.72 KG/M2 | SYSTOLIC BLOOD PRESSURE: 122 MMHG | WEIGHT: 173.4 LBS | DIASTOLIC BLOOD PRESSURE: 80 MMHG | HEIGHT: 63 IN

## 2022-08-25 DIAGNOSIS — O09.293 HX OF PREECLAMPSIA, PRIOR PREGNANCY, CURRENTLY PREGNANT, THIRD TRIMESTER: ICD-10-CM

## 2022-08-25 DIAGNOSIS — O10.919 CHRONIC HYPERTENSION DURING PREGNANCY: ICD-10-CM

## 2022-08-25 DIAGNOSIS — Z3A.38 38 WEEKS GESTATION OF PREGNANCY: ICD-10-CM

## 2022-08-25 DIAGNOSIS — Z13.89 SCREENING FOR GENITOURINARY CONDITION: ICD-10-CM

## 2022-08-25 DIAGNOSIS — U07.1 COVID-19 AFFECTING PREGNANCY IN FIRST TRIMESTER: ICD-10-CM

## 2022-08-25 DIAGNOSIS — O24.410 DIET CONTROLLED GESTATIONAL DIABETES MELLITUS (GDM) IN THIRD TRIMESTER: Primary | ICD-10-CM

## 2022-08-25 DIAGNOSIS — O98.511 COVID-19 AFFECTING PREGNANCY IN FIRST TRIMESTER: ICD-10-CM

## 2022-08-25 DIAGNOSIS — O09.93 SUPERVISION OF HIGH RISK PREGNANCY IN THIRD TRIMESTER: ICD-10-CM

## 2022-08-25 LAB
GLUCOSE URINE, POC: NEGATIVE
PROTEIN,URINE, POC: NORMAL

## 2022-08-25 PROCEDURE — 99213 OFFICE O/P EST LOW 20 MIN: CPT | Performed by: OBSTETRICS & GYNECOLOGY

## 2022-08-25 PROCEDURE — 76819 FETAL BIOPHYS PROFIL W/O NST: CPT | Performed by: OBSTETRICS & GYNECOLOGY

## 2022-08-25 PROCEDURE — 81002 URINALYSIS NONAUTO W/O SCOPE: CPT | Performed by: OBSTETRICS & GYNECOLOGY

## 2022-08-29 ENCOUNTER — ANESTHESIA EVENT (OUTPATIENT)
Dept: LABOR AND DELIVERY | Age: 32
End: 2022-08-29
Payer: MEDICAID

## 2022-08-29 ENCOUNTER — ANESTHESIA (OUTPATIENT)
Dept: LABOR AND DELIVERY | Age: 32
End: 2022-08-29
Payer: MEDICAID

## 2022-08-29 ENCOUNTER — HOSPITAL ENCOUNTER (INPATIENT)
Age: 32
LOS: 2 days | Discharge: HOME | End: 2022-08-31
Attending: OBSTETRICS & GYNECOLOGY | Admitting: OBSTETRICS & GYNECOLOGY
Payer: MEDICAID

## 2022-08-29 DIAGNOSIS — O10.919 CHRONIC HYPERTENSION DURING PREGNANCY: ICD-10-CM

## 2022-08-29 DIAGNOSIS — Z3A.39 39 WEEKS GESTATION OF PREGNANCY: Primary | ICD-10-CM

## 2022-08-29 LAB
ABO + RH BLD: NORMAL
BASOPHILS # BLD: 0 K/UL (ref 0–0.2)
BASOPHILS NFR BLD: 0 % (ref 0–2)
BLOOD GROUP ANTIBODIES SERPL: NORMAL
DIFFERENTIAL METHOD BLD: ABNORMAL
EOSINOPHIL # BLD: 0 K/UL (ref 0–0.8)
EOSINOPHIL NFR BLD: 0 % (ref 0.5–7.8)
ERYTHROCYTE [DISTWIDTH] IN BLOOD BY AUTOMATED COUNT: 25.7 % (ref 11.9–14.6)
HCT VFR BLD AUTO: 35.2 % (ref 35.8–46.3)
HGB BLD-MCNC: 11.2 G/DL (ref 11.7–15.4)
IMM GRANULOCYTES # BLD AUTO: 0.1 K/UL (ref 0–0.5)
IMM GRANULOCYTES NFR BLD AUTO: 2 % (ref 0–5)
LYMPHOCYTES # BLD: 2.3 K/UL (ref 0.5–4.6)
LYMPHOCYTES NFR BLD: 32 % (ref 13–44)
MCH RBC QN AUTO: 27.1 PG (ref 26.1–32.9)
MCHC RBC AUTO-ENTMCNC: 31.8 G/DL (ref 31.4–35)
MCV RBC AUTO: 85.2 FL (ref 79.6–97.8)
MONOCYTES # BLD: 0.7 K/UL (ref 0.1–1.3)
MONOCYTES NFR BLD: 10 % (ref 4–12)
NEUTS SEG # BLD: 4 K/UL (ref 1.7–8.2)
NEUTS SEG NFR BLD: 55 % (ref 43–78)
NRBC # BLD: 0 K/UL (ref 0–0.2)
PLATELET # BLD AUTO: 234 K/UL (ref 150–450)
PMV BLD AUTO: 11 FL (ref 9.4–12.3)
RBC # BLD AUTO: 4.13 M/UL (ref 4.05–5.2)
SPECIMEN EXP DATE BLD: NORMAL
WBC # BLD AUTO: 7.3 K/UL (ref 4.3–11.1)

## 2022-08-29 PROCEDURE — 4A1HXCZ MONITORING OF PRODUCTS OF CONCEPTION, CARDIAC RATE, EXTERNAL APPROACH: ICD-10-PCS | Performed by: OBSTETRICS & GYNECOLOGY

## 2022-08-29 PROCEDURE — 6370000000 HC RX 637 (ALT 250 FOR IP): Performed by: OBSTETRICS & GYNECOLOGY

## 2022-08-29 PROCEDURE — 6360000002 HC RX W HCPCS: Performed by: ANESTHESIOLOGY

## 2022-08-29 PROCEDURE — 3700000025 EPIDURAL BLOCK: Performed by: ANESTHESIOLOGY

## 2022-08-29 PROCEDURE — 2580000003 HC RX 258: Performed by: OBSTETRICS & GYNECOLOGY

## 2022-08-29 PROCEDURE — 7210000100 HC LABOR FEE PER 1 HR

## 2022-08-29 PROCEDURE — 86901 BLOOD TYPING SEROLOGIC RH(D): CPT

## 2022-08-29 PROCEDURE — 7100000010 HC PHASE II RECOVERY - FIRST 15 MIN

## 2022-08-29 PROCEDURE — 0HQ9XZZ REPAIR PERINEUM SKIN, EXTERNAL APPROACH: ICD-10-PCS | Performed by: OBSTETRICS & GYNECOLOGY

## 2022-08-29 PROCEDURE — 51701 INSERT BLADDER CATHETER: CPT

## 2022-08-29 PROCEDURE — 85025 COMPLETE CBC W/AUTO DIFF WBC: CPT

## 2022-08-29 PROCEDURE — 7100000011 HC PHASE II RECOVERY - ADDTL 15 MIN

## 2022-08-29 PROCEDURE — 7220000101 HC DELIVERY VAGINAL/SINGLE

## 2022-08-29 PROCEDURE — 1100000000 HC RM PRIVATE

## 2022-08-29 PROCEDURE — 59409 OBSTETRICAL CARE: CPT | Performed by: OBSTETRICS & GYNECOLOGY

## 2022-08-29 RX ORDER — NALOXONE HYDROCHLORIDE 0.4 MG/ML
INJECTION, SOLUTION INTRAMUSCULAR; INTRAVENOUS; SUBCUTANEOUS PRN
Status: DISCONTINUED | OUTPATIENT
Start: 2022-08-29 | End: 2022-08-29 | Stop reason: HOSPADM

## 2022-08-29 RX ORDER — DIPHENHYDRAMINE HCL 25 MG
25 CAPSULE ORAL EVERY 4 HOURS PRN
Status: DISCONTINUED | OUTPATIENT
Start: 2022-08-29 | End: 2022-08-31 | Stop reason: HOSPADM

## 2022-08-29 RX ORDER — TRANEXAMIC ACID 10 MG/ML
1000 INJECTION, SOLUTION INTRAVENOUS
Status: ACTIVE | OUTPATIENT
Start: 2022-08-29 | End: 2022-08-29

## 2022-08-29 RX ORDER — SODIUM CHLORIDE 9 MG/ML
25 INJECTION, SOLUTION INTRAVENOUS PRN
Status: CANCELLED | OUTPATIENT
Start: 2022-08-29

## 2022-08-29 RX ORDER — ONDANSETRON 2 MG/ML
4 INJECTION INTRAMUSCULAR; INTRAVENOUS EVERY 6 HOURS PRN
Status: DISCONTINUED | OUTPATIENT
Start: 2022-08-29 | End: 2022-08-29

## 2022-08-29 RX ORDER — METHYLERGONOVINE MALEATE 0.2 MG/ML
200 INJECTION INTRAVENOUS
Status: ACTIVE | OUTPATIENT
Start: 2022-08-29 | End: 2022-08-29

## 2022-08-29 RX ORDER — LANOLIN
CREAM (ML) TOPICAL PRN
Status: DISCONTINUED | OUTPATIENT
Start: 2022-08-29 | End: 2022-08-31 | Stop reason: HOSPADM

## 2022-08-29 RX ORDER — DOCUSATE SODIUM 100 MG/1
100 CAPSULE, LIQUID FILLED ORAL 2 TIMES DAILY
Status: DISCONTINUED | OUTPATIENT
Start: 2022-08-29 | End: 2022-08-31 | Stop reason: HOSPADM

## 2022-08-29 RX ORDER — SODIUM CHLORIDE 0.9 % (FLUSH) 0.9 %
5-40 SYRINGE (ML) INJECTION PRN
Status: DISCONTINUED | OUTPATIENT
Start: 2022-08-29 | End: 2022-08-31 | Stop reason: HOSPADM

## 2022-08-29 RX ORDER — SODIUM CHLORIDE, SODIUM LACTATE, POTASSIUM CHLORIDE, CALCIUM CHLORIDE 600; 310; 30; 20 MG/100ML; MG/100ML; MG/100ML; MG/100ML
INJECTION, SOLUTION INTRAVENOUS CONTINUOUS
Status: DISCONTINUED | OUTPATIENT
Start: 2022-08-29 | End: 2022-08-31 | Stop reason: HOSPADM

## 2022-08-29 RX ORDER — OXYCODONE HYDROCHLORIDE 5 MG/1
5 TABLET ORAL EVERY 4 HOURS PRN
Status: DISCONTINUED | OUTPATIENT
Start: 2022-08-29 | End: 2022-08-31 | Stop reason: HOSPADM

## 2022-08-29 RX ORDER — SODIUM CHLORIDE, SODIUM LACTATE, POTASSIUM CHLORIDE, AND CALCIUM CHLORIDE .6; .31; .03; .02 G/100ML; G/100ML; G/100ML; G/100ML
1000 INJECTION, SOLUTION INTRAVENOUS PRN
Status: DISCONTINUED | OUTPATIENT
Start: 2022-08-29 | End: 2022-08-31 | Stop reason: HOSPADM

## 2022-08-29 RX ORDER — SODIUM CHLORIDE 9 MG/ML
INJECTION, SOLUTION INTRAVENOUS PRN
Status: DISCONTINUED | OUTPATIENT
Start: 2022-08-29 | End: 2022-08-31 | Stop reason: HOSPADM

## 2022-08-29 RX ORDER — ONDANSETRON 2 MG/ML
4 INJECTION INTRAMUSCULAR; INTRAVENOUS EVERY 6 HOURS PRN
Status: CANCELLED | OUTPATIENT
Start: 2022-08-29

## 2022-08-29 RX ORDER — SODIUM CHLORIDE 0.9 % (FLUSH) 0.9 %
5-40 SYRINGE (ML) INJECTION EVERY 12 HOURS SCHEDULED
Status: DISCONTINUED | OUTPATIENT
Start: 2022-08-29 | End: 2022-08-31

## 2022-08-29 RX ORDER — DEXTROSE, SODIUM CHLORIDE, SODIUM LACTATE, POTASSIUM CHLORIDE, AND CALCIUM CHLORIDE 5; .6; .31; .03; .02 G/100ML; G/100ML; G/100ML; G/100ML; G/100ML
INJECTION, SOLUTION INTRAVENOUS CONTINUOUS
Status: CANCELLED | OUTPATIENT
Start: 2022-08-29

## 2022-08-29 RX ORDER — ONDANSETRON 2 MG/ML
4 INJECTION INTRAMUSCULAR; INTRAVENOUS EVERY 6 HOURS PRN
Status: DISCONTINUED | OUTPATIENT
Start: 2022-08-29 | End: 2022-08-29 | Stop reason: HOSPADM

## 2022-08-29 RX ORDER — SODIUM CHLORIDE 0.9 % (FLUSH) 0.9 %
5-40 SYRINGE (ML) INJECTION EVERY 12 HOURS SCHEDULED
Status: CANCELLED | OUTPATIENT
Start: 2022-08-29

## 2022-08-29 RX ORDER — ACETAMINOPHEN 500 MG
1000 TABLET ORAL EVERY 8 HOURS PRN
Status: DISCONTINUED | OUTPATIENT
Start: 2022-08-29 | End: 2022-08-31 | Stop reason: HOSPADM

## 2022-08-29 RX ORDER — MISOPROSTOL 200 UG/1
200 TABLET ORAL
Status: ACTIVE | OUTPATIENT
Start: 2022-08-29 | End: 2022-08-29

## 2022-08-29 RX ORDER — CARBOPROST TROMETHAMINE 250 UG/ML
250 INJECTION, SOLUTION INTRAMUSCULAR PRN
Status: DISCONTINUED | OUTPATIENT
Start: 2022-08-29 | End: 2022-08-31 | Stop reason: HOSPADM

## 2022-08-29 RX ORDER — TERBUTALINE SULFATE 1 MG/ML
0.25 INJECTION, SOLUTION SUBCUTANEOUS ONCE
Status: DISCONTINUED | OUTPATIENT
Start: 2022-08-29 | End: 2022-08-31 | Stop reason: HOSPADM

## 2022-08-29 RX ORDER — SODIUM CHLORIDE 9 MG/ML
25 INJECTION, SOLUTION INTRAVENOUS PRN
Status: DISCONTINUED | OUTPATIENT
Start: 2022-08-29 | End: 2022-08-31 | Stop reason: HOSPADM

## 2022-08-29 RX ORDER — FENTANYL CITRATE 50 UG/ML
INJECTION, SOLUTION INTRAMUSCULAR; INTRAVENOUS PRN
Status: DISCONTINUED | OUTPATIENT
Start: 2022-08-29 | End: 2022-08-29 | Stop reason: SDUPTHER

## 2022-08-29 RX ORDER — FENTANYL CITRATE 50 UG/ML
INJECTION, SOLUTION INTRAMUSCULAR; INTRAVENOUS
Status: COMPLETED
Start: 2022-08-29 | End: 2022-08-29

## 2022-08-29 RX ORDER — SODIUM CHLORIDE 0.9 % (FLUSH) 0.9 %
5-40 SYRINGE (ML) INJECTION PRN
Status: CANCELLED | OUTPATIENT
Start: 2022-08-29

## 2022-08-29 RX ORDER — SODIUM CHLORIDE, SODIUM LACTATE, POTASSIUM CHLORIDE, AND CALCIUM CHLORIDE .6; .31; .03; .02 G/100ML; G/100ML; G/100ML; G/100ML
500 INJECTION, SOLUTION INTRAVENOUS PRN
Status: DISCONTINUED | OUTPATIENT
Start: 2022-08-29 | End: 2022-08-31 | Stop reason: HOSPADM

## 2022-08-29 RX ORDER — IBUPROFEN 800 MG/1
800 TABLET ORAL EVERY 8 HOURS
Status: DISCONTINUED | OUTPATIENT
Start: 2022-08-29 | End: 2022-08-31 | Stop reason: HOSPADM

## 2022-08-29 RX ORDER — METHYLERGONOVINE MALEATE 0.2 MG/ML
200 INJECTION INTRAVENOUS PRN
Status: DISCONTINUED | OUTPATIENT
Start: 2022-08-29 | End: 2022-08-31 | Stop reason: HOSPADM

## 2022-08-29 RX ORDER — MORPHINE SULFATE 2 MG/ML
2 INJECTION, SOLUTION INTRAMUSCULAR; INTRAVENOUS
Status: CANCELLED | OUTPATIENT
Start: 2022-08-29

## 2022-08-29 RX ORDER — DOCUSATE SODIUM 100 MG/1
100 CAPSULE, LIQUID FILLED ORAL 2 TIMES DAILY
Status: DISCONTINUED | OUTPATIENT
Start: 2022-08-29 | End: 2022-08-29

## 2022-08-29 RX ORDER — METHYLERGONOVINE MALEATE 0.2 MG/ML
200 INJECTION INTRAVENOUS PRN
Status: CANCELLED | OUTPATIENT
Start: 2022-08-29

## 2022-08-29 RX ORDER — LABETALOL 200 MG/1
200 TABLET, FILM COATED ORAL 2 TIMES DAILY
Status: DISCONTINUED | OUTPATIENT
Start: 2022-08-29 | End: 2022-08-31 | Stop reason: HOSPADM

## 2022-08-29 RX ORDER — SODIUM CHLORIDE, SODIUM LACTATE, POTASSIUM CHLORIDE, AND CALCIUM CHLORIDE .6; .31; .03; .02 G/100ML; G/100ML; G/100ML; G/100ML
1000 INJECTION, SOLUTION INTRAVENOUS PRN
Status: CANCELLED | OUTPATIENT
Start: 2022-08-29

## 2022-08-29 RX ORDER — MISOPROSTOL 200 UG/1
800 TABLET ORAL PRN
Status: DISCONTINUED | OUTPATIENT
Start: 2022-08-29 | End: 2022-08-31 | Stop reason: HOSPADM

## 2022-08-29 RX ORDER — ROPIVACAINE HYDROCHLORIDE 2 MG/ML
INJECTION, SOLUTION EPIDURAL; INFILTRATION; PERINEURAL PRN
Status: DISCONTINUED | OUTPATIENT
Start: 2022-08-29 | End: 2022-08-29 | Stop reason: SDUPTHER

## 2022-08-29 RX ORDER — SODIUM CHLORIDE, SODIUM LACTATE, POTASSIUM CHLORIDE, AND CALCIUM CHLORIDE .6; .31; .03; .02 G/100ML; G/100ML; G/100ML; G/100ML
500 INJECTION, SOLUTION INTRAVENOUS PRN
Status: CANCELLED | OUTPATIENT
Start: 2022-08-29

## 2022-08-29 RX ADMIN — LABETALOL HYDROCHLORIDE 200 MG: 200 TABLET, FILM COATED ORAL at 21:09

## 2022-08-29 RX ADMIN — ROPIVACAINE HYDROCHLORIDE 5 ML: 2 INJECTION, SOLUTION EPIDURAL; INFILTRATION; PERINEURAL at 10:29

## 2022-08-29 RX ADMIN — DOCUSATE SODIUM 100 MG: 100 CAPSULE ORAL at 21:09

## 2022-08-29 RX ADMIN — SODIUM CHLORIDE, POTASSIUM CHLORIDE, SODIUM LACTATE AND CALCIUM CHLORIDE: 600; 310; 30; 20 INJECTION, SOLUTION INTRAVENOUS at 09:26

## 2022-08-29 RX ADMIN — ROPIVACAINE HYDROCHLORIDE 5 ML: 2 INJECTION, SOLUTION EPIDURAL; INFILTRATION; PERINEURAL at 10:32

## 2022-08-29 RX ADMIN — Medication 166.7 ML: at 12:47

## 2022-08-29 RX ADMIN — ROPIVACAINE HYDROCHLORIDE 10 ML/HR: 2 INJECTION, SOLUTION EPIDURAL; INFILTRATION; PERINEURAL at 10:36

## 2022-08-29 RX ADMIN — IBUPROFEN 800 MG: 800 TABLET, FILM COATED ORAL at 18:00

## 2022-08-29 RX ADMIN — WITCH HAZEL 40 EACH: 500 SOLUTION RECTAL; TOPICAL at 18:00

## 2022-08-29 RX ADMIN — FENTANYL CITRATE 100 MCG: 50 INJECTION, SOLUTION INTRAMUSCULAR; INTRAVENOUS at 12:23

## 2022-08-29 RX ADMIN — ROPIVACAINE HYDROCHLORIDE 5 ML: 2 INJECTION, SOLUTION EPIDURAL; INFILTRATION; PERINEURAL at 12:23

## 2022-08-29 NOTE — PROGRESS NOTES
SBAR OUT Report: Mother    Verbal report given to Maverick Casey RN (full name & credentials) on this patient, who is now being transferred to MIU (unit) for routine progression of patient care. The patient is not wearing a green \"Anesthesia-Duramorph\" band. Report consisted of patient's Situation, Background, Assessment and Recommendations (SBAR). [place] ID bands were compared with the identification form, and verified with the patient and receiving nurse. Information from the Nurse Handoff Report and the Young Report was reviewed with the receiving nurse; opportunity for questions and clarification provided.

## 2022-08-29 NOTE — ANESTHESIA POSTPROCEDURE EVALUATION
Anesthesiology Epidural Followup Note    Isabela Bunch  29 y.o.  female      Visit Vitals  BP (!) 145/78   Pulse 79   Temp 98.1 °F (36.7 °C) (Oral)   Resp 20   Ht 5' 3\" (1.6 m)   Wt 173 lb (78.5 kg)   SpO2 97%   Breastfeeding Unknown   BMI 30.65 kg/m²       Pt is s/p vaginal delivery with continuous labor epidural. Patient had adequate pain control during labor and delivery, and she is currently without complaints. Motor and sensory function has returned to baseline in lower extremities. Back exam is clear with no signs of infection or erythema. No apparent anesthetic complications. Patient is satisfied with anesthetic care. Pain control and follow up per obstetrician.       Marylen Bourdon, MD  Anesthesiologist  August 29, 2022

## 2022-08-29 NOTE — H&P
Subjective:     Ehsan Grimes, MRN: [id number], is a 29 y.o.  female presents with Surinder Hy , well controlled GDM and favorable cervix for IOL, unchanged course. See office notes on prenatal care. Patient Active Problem List    Diagnosis Date Noted    Iron deficiency 2022    Diet controlled gestational diabetes mellitus (GDM) in third trimester 2022    Hx of preeclampsia, prior pregnancy, currently pregnant, third trimester 2022    Headache in pregnancy, antepartum, third trimester 2022    Anemia in pregnancy, third trimester 2022    COVID-19 affecting pregnancy in first trimester 2022    High-risk pregnancy in third trimester 2022    Chronic hypertension during pregnancy 2022    Family history of Down syndrome 2020     Past Medical History:   Diagnosis Date    Anemia     Chronic hypertension during pregnancy     COVID-19 vaccine series completed 2022    2nd COVID vaccine, 2021      Diarrhea during pregnancy 22 at [facility]: c/o diarrhea. Had Chronic Diarrhea in last pregnancy in 3rd trimester. Nonpainful, no blood. Instructed to take 2 peptobismol caplets daily and immodium as needed.     Eclampsia     Gestational diabetes 2022    Gestational hypertension     Gestational hypertension, third trimester 2020    Hypertension     Iron deficiency 2022    Miscarriage     Preeclampsia     2020 pregnancy    Preeclampsia in postpartum period      delivery       Past Surgical History:   Procedure Laterality Date    DILATION AND EVACUATION OF UTERUS        Medications Prior to Admission: acetaminophen (TYLENOL) 500 MG tablet, Take 1,000 mg by mouth every 6 hours as needed  blood glucose monitor strips, Use to check blood sugar readings 4 times daily  blood glucose monitor kit and supplies, Please supply patient with a glucose monitor that is covered by insurance that can be used to check blood sugar readings 4 times daily  Lancets MISC, 1 each by Does not apply route daily Use to check blood sugar readings 4 times daily  labetalol (NORMODYNE) 200 MG tablet, Take 1 tablet by mouth 3 times daily  Prenat w/o S-DX-Ewvzdmn-FA-DHA (PNV-DHA PO), Take by mouth  Cholecalciferol 50 MCG (2000 UT) CAPS, Take 2,000 Units by mouth daily  ondansetron (ZOFRAN-ODT) 4 MG disintegrating tablet, Take 4 mg by mouth every 8 hours as needed (Patient not taking: Reported on 8/29/2022)  No Known Allergies   Social History     Tobacco Use    Smoking status: Never    Smokeless tobacco: Never   Substance Use Topics    Alcohol use: Not Currently      Family History   Problem Relation Age of Onset    Hypertension Mother     Cancer Maternal Grandmother         brain         Prenatal Labs: No components found for: OBEXTABORH, OBEXTABSCRN, OBEXTRUBELLA, OBEXTGRBS, OBEXTHBSAG, OBEXTHIV, OBEXTRPR, OBEXTGONORR, OBEXTCHLAM     Review of Systems  Constitutional: negative  Respiratory: negative  Cardiovascular: negative  Musculoskeletal:negative    Objective:     Patient Vitals for the past 8 hrs:   BP Temp Temp src Pulse Resp SpO2   08/29/22 0847 (!) 146/88 98.3 °F (36.8 °C) Oral 75 18 97 %     No intake or output data in the 24 hours ending 08/29/22 0924  BP (!) 146/88   Pulse 75   Temp 98.3 °F (36.8 °C) (Oral)   Resp 18   LMP 11/05/2021   SpO2 97%   General appearance: alert, appears stated age, cooperative, and no distress  Head: Normocephalic, without obvious abnormality, atraumatic  Back: symmetric, no curvature. ROM normal. No CVA tenderness.   Lungs: clear to auscultation bilaterally  Heart: regular rate and rhythm, S1, S2 normal, no murmur, click, rub or gallop  Abdomen:  gravid at term  Pelvic: External genitalia normal, Vagina normal without discharge cervix 3cm  Extremities: extremities normal, atraumatic, no cyanosis or edema  Pulses: 2+ and symmetric  Skin: Skin color, texture, turgor normal. No rashes or lesions      Assessment:     Active Problems:    * No active hospital problems. *  Resolved Problems:    * No resolved hospital problems. *    All questions answered, will proceed.     TIUP , beta neg  Plan:       TIUP, VAMSHI, AROM, exp mgt

## 2022-08-29 NOTE — PROCEDURES
Delivery Note    Patient Name: Pearl Manzo  MRN: [id number]    Obstetrician:  Maninder Allison MD    Assistant: none    Pre-Delivery Diagnosis: Term pregnancy, Induced labor, Single fetus, and Uncomplicated pregnancy    Post-Delivery Diagnosis: Female    Intrapartum Event: None    Procedure: Spontaneous vaginal delivery    Epidural:  yes    SPINAL    Monitor:  Fetal Heart Tones - External and Uterine Contractions - External    Indications for instrumental delivery: none    Estimated Blood Loss: 100    Episiotomy: none    Laceration(s):  1st degree    Laceration(s) repair:  yes    Presentation: Cephalic    Fetal Description: tobias    Fetal Position: Left Occiput Anterior    Birth Weight: 7-7    Birth Length: 52    Apgar - One Minute: 7    Apgar - Five Minutes: 9    Umbilical Cord: 3 vessels present and Cord blood sent to lab for type, Rh, and Sixto' test    Specimens: none           Complications:  none             Attending Attestation: I was present and scrubbed for the entire procedure.

## 2022-08-29 NOTE — PROGRESS NOTES
RN at bedside at 1234. Fetal head . RN called for assistance and instructed pt to bear down. Marylee Buckle, RN at bedside at [phone]. Viable baby girl born at [phone]. Dr. Michael Thomas at bedside immediately following delivery. Infant apgars 7,9. VSS. Placenta delivered at [phone]. Pitocin bolus started. Repair in progress. See MD note and delivery summary for details.

## 2022-08-29 NOTE — PROGRESS NOTES
Epidural in place. Pt tolerated well. [FreeTextEntry1] : Impression:\par Pt is with prior h/o S1 radiculopathy, may have burning dysesthesia of left foot. He has Chronic pain,  Has one beat clonus and diffuse hyperreflexia in the previous visit . He experiences severe pain with difficulty walking and burning foot and weakness of his leg.\par \par Plan:\par \par Meloxicam 15 mg I tab daily  PRN for pain\par Continue PT at home as advised \par CT scan L spine and CT scan cervical spine, will request and try for approval  again\par Follow up in 1 month with Dr. Yoder\par \par Tele visit from 12:05 to 12:30 pm (25 min)\par

## 2022-08-29 NOTE — ANESTHESIA PROCEDURE NOTES
Epidural Block    Patient location during procedure: OB  Start time: 8/29/2022 10:19 AM  End time: 8/29/2022 10:26 AM  Reason for block: labor epidural  Staffing  Performed: anesthesiologist   Anesthesiologist: Darian Gupta MD  Epidural  Patient position: sitting  Prep: ChloraPrep  Patient monitoring: continuous pulse ox and frequent blood pressure checks  Approach: midline  Location: L3-4  Injection technique: PORTILLO saline  Provider prep: mask and sterile gloves  Needle  Needle type: Tuohy   Needle gauge: 17 G  Needle length: 3.5 in  Needle insertion depth: 6 cm  Catheter type: end hole  Catheter size: 19 G  Catheter at skin depth: 11 cm  Test dose: negativeCatheter Secured: tegaderm and tape (Sterile Mastisol applied at skin)  Assessment  Hemodynamics: stable  Attempts: 1  Outcomes: patient tolerated procedure well  Additional Notes  3 cc 1% lidocaine local at needle insertion site. Risks discussed including damage to muscle or nerve.   Preanesthetic Checklist  Completed: patient identified, IV checked, risks and benefits discussed, surgical/procedural consents, equipment checked, pre-op evaluation, timeout performed, anesthesia consent given, oxygen available and monitors applied/VS acknowledged

## 2022-08-29 NOTE — ANESTHESIA PRE PROCEDURE
Department of Anesthesiology  Preprocedure Note       Name:  Skyler Dennison   Age:  29 y.o.  :  1990                                          MRN:  [id number]         Date:  2022      Surgeon: * No surgeons listed *    Procedure: * No procedures listed *    Medications prior to admission:   Prior to Admission medications    Medication Sig Start Date End Date Taking?  Authorizing Provider   acetaminophen (TYLENOL) 500 MG tablet Take 1,000 mg by mouth every 6 hours as needed    Historical Provider, MD   blood glucose monitor strips Use to check blood sugar readings 4 times daily 22   Lulu Leggett MD   blood glucose monitor kit and supplies Please supply patient with a glucose monitor that is covered by insurance that can be used to check blood sugar readings 4 times daily 22   Lulu Leggett MD   Lancets MISC 1 each by Does not apply route daily Use to check blood sugar readings 4 times daily 22   Lulu Leggett MD   labetalol (NORMODYNE) 200 MG tablet Take 1 tablet by mouth 3 times daily 22   Jolanta De La Rosa MD   Prenat w/o L-MK-Oaynutp-FA-DHA (PNV-DHA PO) Take by mouth    Historical Provider, MD   Cholecalciferol 50 MCG (2000) CAPS Take 2,000 Units by mouth daily    Ar Automatic Reconciliation   ondansetron (ZOFRAN-ODT) 4 MG disintegrating tablet Take 4 mg by mouth every 8 hours as needed  Patient not taking: Reported on 2022 3/22/22   Ar Automatic Reconciliation       Current medications:    Current Facility-Administered Medications   Medication Dose Route Frequency Provider Last Rate Last Admin    lactated ringers infusion   IntraVENous Continuous Fatmata Morejon  mL/hr at 22 0926 New Bag at 22 09    lactated ringers bolus  500 mL IntraVENous PRN Fatmata Morejon MD        Or    lactated ringers bolus  1,000 mL IntraVENous PRN Fatmata Morejon MD        0.9 % sodium chloride infusion  25 mL IntraVENous PRN MD Alicia Torres Diarrhea during pregnancy 22 at [facility]: c/o diarrhea. Had Chronic Diarrhea in last pregnancy in 3rd trimester. Nonpainful, no blood. Instructed to take 2 peptobismol caplets daily and immodium as needed.  Eclampsia     Gestational diabetes 2022    Gestational hypertension     Gestational hypertension, third trimester 2020    Hypertension     Iron deficiency 2022    Miscarriage     Preeclampsia      pregnancy    Preeclampsia in postpartum period      delivery        Past Surgical History:        Procedure Laterality Date    DILATION AND EVACUATION OF UTERUS         Social History:    Social History     Tobacco Use    Smoking status: Never    Smokeless tobacco: Never   Substance Use Topics    Alcohol use: Not Currently                                Counseling given: Not Answered      Vital Signs (Current):   Vitals:    22 1029 22 1030 22 1031 22 1033   BP: (!) 151/85 (!) 159/87 (!) 149/76 (!) 150/83   Pulse: 75 83 78 74   Resp:       Temp:       TempSrc:       SpO2:       Weight:       Height:                                                  BP Readings from Last 3 Encounters:   22 (!) 150/83   22 122/80   22 128/88       NPO Status:                                                                                 BMI:   Wt Readings from Last 3 Encounters:   22 173 lb (78.5 kg)   22 173 lb 6.4 oz (78.7 kg)   22 173 lb 3.2 oz (78.6 kg)     Body mass index is 30.65 kg/m².     CBC:   Lab Results   Component Value Date/Time    WBC 7.3 2022 09:28 AM    RBC 4.13 2022 09:28 AM    HGB 11.2 2022 09:28 AM    HCT 35.2 2022 09:28 AM    MCV 85.2 2022 09:28 AM    RDW 25.7 2022 09:28 AM     2022 09:28 AM       CMP:   Lab Results   Component Value Date/Time     08/15/2022 11:25 AM    K 3.9 08/15/2022 11:25 AM     08/15/2022 11:25 AM    CO2 22 08/15/2022 11:25 AM    BUN 6 08/15/2022 11:25 AM    CREATININE 0.40 08/15/2022 11:25 AM    GFRAA >60 08/15/2022 11:25 AM    AGRATIO 1.2 03/22/2022 03:45 PM    LABGLOM >60 08/15/2022 11:25 AM    GLUCOSE 74 08/15/2022 11:25 AM    GLU 82 11/09/2020 08:14 AM    PROT 6.5 08/15/2022 11:25 AM    CALCIUM 8.9 08/15/2022 11:25 AM    BILITOT 0.2 08/15/2022 11:25 AM    ALKPHOS 144 08/15/2022 11:25 AM    ALKPHOS 54 03/22/2022 03:45 PM    AST 17 08/15/2022 11:25 AM    ALT 19 08/15/2022 11:25 AM       POC Tests: No results for input(s): POCGLU, POCNA, POCK, POCCL, POCBUN, POCHEMO, POCHCT in the last 72 hours. Coags: No results found for: PROTIME, INR, APTT    HCG (If Applicable): No results found for: PREGTESTUR, PREGSERUM, HCG, HCGQUANT     ABGs: No results found for: PHART, PO2ART, WVH7MHX, RQD6TQR, BEART, V0BKLTCG     Type & Screen (If Applicable):  No results found for: LABABO, LABRH    Drug/Infectious Status (If Applicable):  No results found for: HIV, HEPCAB    COVID-19 Screening (If Applicable): No results found for: COVID19        Anesthesia Evaluation  Patient summary reviewed  Airway: Mallampati: III  TM distance: >3 FB   Neck ROM: full     Dental: normal exam         Pulmonary:Negative Pulmonary ROS and normal exam  breath sounds clear to auscultation                             Cardiovascular:Negative CV ROS  Exercise tolerance: good (>4 METS),           Rhythm: regular  Rate: normal                 ROS comment: Pt denies recent CP, SOB or changes in functional status     Neuro/Psych:   Negative Neuro/Psych ROS              GI/Hepatic/Renal: Neg GI/Hepatic/Renal ROS            Endo/Other:    (+) Diabetes (Gestational DM), blood dyscrasia: anemia:., .                 Abdominal:              PE comment: Deferred   Vascular: negative vascular ROS. Other Findings:           Anesthesia Plan      epidural     ASA 2             Anesthetic plan and risks discussed with patient and spouse.                         Katrine Saint, MD   8/29/2022

## 2022-08-30 PROBLEM — Z3A.39 39 WEEKS GESTATION OF PREGNANCY: Status: RESOLVED | Noted: 2022-08-29 | Resolved: 2022-08-30

## 2022-08-30 PROCEDURE — 1100000000 HC RM PRIVATE

## 2022-08-30 PROCEDURE — 6370000000 HC RX 637 (ALT 250 FOR IP): Performed by: OBSTETRICS & GYNECOLOGY

## 2022-08-30 RX ORDER — OXYCODONE HYDROCHLORIDE 5 MG/1
5 TABLET ORAL EVERY 6 HOURS PRN
Qty: 12 TABLET | Refills: 0 | Status: SHIPPED | OUTPATIENT
Start: 2022-08-30 | End: 2022-09-04

## 2022-08-30 RX ORDER — LABETALOL 200 MG/1
200 TABLET, FILM COATED ORAL 2 TIMES DAILY
Qty: 60 TABLET | Refills: 3 | Status: SHIPPED | OUTPATIENT
Start: 2022-08-30 | End: 2022-08-31 | Stop reason: HOSPADM

## 2022-08-30 RX ORDER — IBUPROFEN 800 MG/1
800 TABLET ORAL EVERY 8 HOURS PRN
Qty: 40 TABLET | Refills: 3 | Status: SHIPPED | OUTPATIENT
Start: 2022-08-30 | End: 2022-09-12 | Stop reason: ALTCHOICE

## 2022-08-30 RX ADMIN — ACETAMINOPHEN 1000 MG: 500 TABLET, FILM COATED ORAL at 13:27

## 2022-08-30 RX ADMIN — IBUPROFEN 800 MG: 800 TABLET, FILM COATED ORAL at 09:16

## 2022-08-30 RX ADMIN — DOCUSATE SODIUM 100 MG: 100 CAPSULE ORAL at 20:54

## 2022-08-30 RX ADMIN — OXYCODONE 5 MG: 5 TABLET ORAL at 19:50

## 2022-08-30 RX ADMIN — LABETALOL HYDROCHLORIDE 200 MG: 200 TABLET, FILM COATED ORAL at 20:54

## 2022-08-30 RX ADMIN — IBUPROFEN 800 MG: 800 TABLET, FILM COATED ORAL at 00:37

## 2022-08-30 RX ADMIN — LABETALOL HYDROCHLORIDE 200 MG: 200 TABLET, FILM COATED ORAL at 09:16

## 2022-08-30 RX ADMIN — ACETAMINOPHEN 1000 MG: 500 TABLET, FILM COATED ORAL at 05:22

## 2022-08-30 RX ADMIN — IBUPROFEN 800 MG: 800 TABLET, FILM COATED ORAL at 17:32

## 2022-08-30 RX ADMIN — DOCUSATE SODIUM 100 MG: 100 CAPSULE ORAL at 09:16

## 2022-08-30 RX ADMIN — ACETAMINOPHEN 1000 MG: 500 TABLET, FILM COATED ORAL at 23:08

## 2022-08-30 ASSESSMENT — PAIN SCALES - GENERAL
PAINLEVEL_OUTOF10: 5
PAINLEVEL_OUTOF10: 5
PAINLEVEL_OUTOF10: 6
PAINLEVEL_OUTOF10: 5
PAINLEVEL_OUTOF10: 6

## 2022-08-30 ASSESSMENT — PAIN DESCRIPTION - ORIENTATION
ORIENTATION: ANTERIOR;LOWER
ORIENTATION: LOWER;ANTERIOR
ORIENTATION: ANTERIOR;LOWER
ORIENTATION: ANTERIOR;LOWER
ORIENTATION: LOWER;ANTERIOR

## 2022-08-30 ASSESSMENT — PAIN DESCRIPTION - LOCATION
LOCATION: ABDOMEN

## 2022-08-30 ASSESSMENT — PAIN DESCRIPTION - DESCRIPTORS
DESCRIPTORS: ACHING;SORE;CRAMPING
DESCRIPTORS: CRAMPING
DESCRIPTORS: CRAMPING
DESCRIPTORS: CRAMPING;SORE;ACHING
DESCRIPTORS: CRAMPING

## 2022-08-30 ASSESSMENT — PAIN - FUNCTIONAL ASSESSMENT
PAIN_FUNCTIONAL_ASSESSMENT: ACTIVITIES ARE NOT PREVENTED

## 2022-08-30 NOTE — LACTATION NOTE

## 2022-08-30 NOTE — LACTATION NOTE
This note was copied from a baby's chart. In to see mom and infant for first time and possibly early discharge later today. Mom's 2nd baby. She  her first child x 9 months. Mom breastfeeding  on right breast in cradle position during visit. Flanged latch, nutritive sucking w/ stimulation. No c/o pain. Reviewed 8-12 feeds per day in  period, monitor output. Mom has pump to use at home if infant not latching and feeding well overall. Reviewed normalcy of periods of cluster feed ing and discharge information if mom does go home later today. Mom declined any breast feeding assistance. Will all out if needs anything else from lactation.

## 2022-08-30 NOTE — CARE COORDINATION
Chart reviewed - no needs identified. SW met with patient to complete initial assessment. Patient denies any history of postpartum depression/anxiety. Patient without a PCP and denied the need for assistance with obtaining a PCP. Patient given informational packet on  mood & anxiety disorders (resources/education). Family denies any additional needs from  at this time. Family has 's contact information should any needs/questions arise.     MIRNA Burns, [address] [facility]   [phone]

## 2022-08-30 NOTE — PROGRESS NOTES
Tylenol 1000 mg PO given to pt per request.  Educated pt to call out if pain medication does not help.

## 2022-08-30 NOTE — DISCHARGE SUMMARY
[facility] [place] [address] Discharge Summary     Patient ID:  Jimmy Robledo  [id number]  70 y.o.  1990    Admit date: 8/29/2022    Discharge date and time: No discharge date for patient encounter. Admitting Physician: Delilah Ruiz MD     Discharge Physician: Faustino Banuelos M.D. Admission Diagnoses: No admission diagnoses are documented for this encounter. Problem List: Hospital - Active Problems:    * No active hospital problems. *  Resolved Problems:    39 weeks gestation of pregnancy   ; Other -   Patient Active Problem List   Diagnosis    Family history of Down syndrome    COVID-19 affecting pregnancy in first trimester    High-risk pregnancy in third trimester    Chronic hypertension during pregnancy    Hx of preeclampsia, prior pregnancy, currently pregnant, third trimester    Headache in pregnancy, antepartum, third trimester    Anemia in pregnancy, third trimester    Diet controlled gestational diabetes mellitus (GDM) in third trimester    Iron deficiency        Discharge Diagnoses: No admission diagnoses are documented for this encounter. Hospital Course: Jimmy Robledo had unremarkeable progressive recovery. and Eating, ambulating, and voiding in a routine manner. Significant Diagnostic Studies: No results found for this or any previous visit (from the past 24 hour(s)). Procedures: spontaneous vaginal delivery    Discharge Exam:  BP (!) 147/72   Pulse 71   Temp 98.1 °F (36.7 °C) (Oral)   Resp 18   Ht 5' 3\" (1.6 m)   Wt 173 lb (78.5 kg)   LMP 11/05/2021   SpO2 95%   Breastfeeding Unknown   BMI 30.65 kg/m²      Heart: regular rate and rhythm, S1, S2 normal, no murmur, click, rub or gallop  Lungs:clear to auscultation bilaterally  Extremities: normal, atraumatic, no cyanosis or edema.  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   Qty: 12 tablet, Refills: 0    Comments: Reduce doses taken as pain becomes manageable  Associated Diagnoses: 39 weeks gestation of pregnancy           CONTINUE these medications which have CHANGED    Details   labetalol (NORMODYNE) 200 MG tablet Take 1 tablet by mouth 2 times daily  Qty: 60 tablet, Refills: 3           CONTINUE these medications which have NOT CHANGED    Details   acetaminophen (TYLENOL) 500 MG tablet Take 1,000 mg by mouth every 6 hours as needed      blood glucose monitor strips Use to check blood sugar readings 4 times daily  Qty: 150 strip, Refills: 5    Associated Diagnoses: Gestational diabetes mellitus (GDM) in third trimester, gestational diabetes method of control unspecified; 31 weeks gestation of pregnancy; Prenatal care, subsequent pregnancy, third trimester      blood glucose monitor kit and supplies Please supply patient with a glucose monitor that is covered by insurance that can be used to check blood sugar readings 4 times daily  Qty: 1 kit, Refills: 0    Associated Diagnoses: Gestational diabetes mellitus (GDM) in third trimester, gestational diabetes method of control unspecified; 31 weeks gestation of pregnancy; Prenatal care, subsequent pregnancy, third trimester      Lancets MISC 1 each by Does not apply route daily Use to check blood sugar readings 4 times daily  Qty: 150 each, Refills: 5    Associated Diagnoses: Gestational diabetes mellitus (GDM) in third trimester, gestational diabetes method of control unspecified; 31 weeks gestation of pregnancy; Prenatal care, subsequent pregnancy, third trimester      Prenat w/o O-RW-Pxahmvp-FA-DHA (PNV-DHA PO) Take by mouth      Cholecalciferol 50 MCG (2000 UT) CAPS Take 2,000 Units by mouth daily           STOP taking these medications       ondansetron (ZOFRAN-ODT) 4 MG disintegrating tablet Comments:   Reason for Stopping:              Activity: physical activity is restricted per discharge instructions  Diet: resume normal diet  Wound Care: Keep wound clean and dry, as directed    Follow-up with Kaity in 2 weeks.     Signed:  Kari Barksdale MD  8/30/2022  9:29 AM

## 2022-08-31 VITALS
HEIGHT: 63 IN | SYSTOLIC BLOOD PRESSURE: 160 MMHG | RESPIRATION RATE: 18 BRPM | BODY MASS INDEX: 30.65 KG/M2 | HEART RATE: 74 BPM | TEMPERATURE: 98.1 F | OXYGEN SATURATION: 97 % | DIASTOLIC BLOOD PRESSURE: 90 MMHG | WEIGHT: 173 LBS

## 2022-08-31 PROCEDURE — 6370000000 HC RX 637 (ALT 250 FOR IP): Performed by: OBSTETRICS & GYNECOLOGY

## 2022-08-31 RX ORDER — FUROSEMIDE 20 MG/1
20 TABLET ORAL DAILY
Status: DISCONTINUED | OUTPATIENT
Start: 2022-08-31 | End: 2022-08-31 | Stop reason: HOSPADM

## 2022-08-31 RX ORDER — LABETALOL 200 MG/1
200 TABLET, FILM COATED ORAL 3 TIMES DAILY
Qty: 90 TABLET | Refills: 1 | Status: SHIPPED | OUTPATIENT
Start: 2022-08-31

## 2022-08-31 RX ORDER — FUROSEMIDE 40 MG/1
40 TABLET ORAL DAILY
Qty: 3 TABLET | Refills: 0 | Status: SHIPPED | OUTPATIENT
Start: 2022-08-31 | End: 2022-09-12 | Stop reason: ALTCHOICE

## 2022-08-31 RX ORDER — LABETALOL 100 MG/1
100 TABLET, FILM COATED ORAL ONCE
Status: COMPLETED | OUTPATIENT
Start: 2022-08-31 | End: 2022-08-31

## 2022-08-31 RX ADMIN — LABETALOL HYDROCHLORIDE 200 MG: 200 TABLET, FILM COATED ORAL at 09:31

## 2022-08-31 RX ADMIN — ACETAMINOPHEN 1000 MG: 500 TABLET, FILM COATED ORAL at 09:31

## 2022-08-31 RX ADMIN — IBUPROFEN 800 MG: 800 TABLET, FILM COATED ORAL at 10:49

## 2022-08-31 RX ADMIN — IBUPROFEN 800 MG: 800 TABLET, FILM COATED ORAL at 02:14

## 2022-08-31 RX ADMIN — FUROSEMIDE 20 MG: 20 TABLET ORAL at 11:47

## 2022-08-31 RX ADMIN — LABETALOL HYDROCHLORIDE 100 MG: 100 TABLET, FILM COATED ORAL at 11:47

## 2022-08-31 RX ADMIN — DOCUSATE SODIUM 100 MG: 100 CAPSULE ORAL at 09:31

## 2022-08-31 NOTE — PROGRESS NOTES
Dr. Cole Sinclair at nurses station and informed pt pt c/o throbbing headache and edema. MD voiced understanding. MD to assess pt, no new orders received at this time.

## 2022-08-31 NOTE — PROGRESS NOTES
Pt educated that she will start Labetalol 200mg TID. Lauren Cullen pt voiced understanding. Lasix and labetalol 100mg given see MAR.

## 2022-08-31 NOTE — PROGRESS NOTES
Patient discharged to home per Dr. Abiola Merida orders. Discharge instructions reviewed with patient and pt given a copy. Pt aware she needs to be seen at [facility] in 2 days for BP check. Questions encouraged and answered. Patient verbalizes understanding. Patient escorted by [facility] staff to private vehicle. Pt declines w/c for d/c. Stable at discharge.

## 2022-08-31 NOTE — DISCHARGE SUMMARY
Post-Partum Day Number 2 Progress/Discharge Note    Patient doing well post-partum without significant complaint. Voiding without difficulty, normal lochia, positive flatus. HA resolved with oral meds. Edema not worse today than yesterday. Vitals:  Patient Vitals for the past 8 hrs:   BP Temp Temp src Pulse Resp SpO2   22 0931 (!) 145/72 -- -- 69 -- --   22 0750 (!) 145/72 97.9 °F (36.6 °C) Oral 69 16 96 %   22 0406 (!) 158/84 98.2 °F (36.8 °C) Oral 65 16 98 %     Temp (24hrs), Av.9 °F (36.6 °C), Min:97.4 °F (36.3 °C), Max:98.2 °F (36.8 °C)      Vital signs stable, afebrile. Exam:  Patient without distress. Abdomen soft, fundus firm at level of umbilicus, non tender               Perineum with normal lochia noted. Lower extremities are negative for cords or tenderness. Edema is mild, to just above ankle bilaterally, negative clonus, 1+DTR's    Lab/Data Review:  Lab Results   Component Value Date    WBC 7.3 2022    HGB 11.2 (L) 2022    HCT 35.2 (L) 2022    MCV 85.2 2022     2022         Assessment and Plan:  Patient appears to be having uncomplicated post-partum course. Resume home BP regimen, Rx for lasix to facilitate diuresis. Continue routine perineal care and maternal education. Plan discharge for today with follow up in our office in 1 weeks.

## 2022-08-31 NOTE — DISCHARGE INSTRUCTIONS
After Your Delivery (the Postpartum Period): Care Instructions  Overview     Congratulations on the birth of your baby. Like pregnancy, the  period can be a time of excitement, deanna, and exhaustion. You may look at your wondrous little baby and feel happy. You may also be overwhelmed by your new sleep hoursand new responsibilities. At first, babies often sleep during the days and are awake at night. They do not have a pattern or routine. They may make sudden gasps, jerk themselves awake, or look like they have crossed eyes. These are all normal, and they mayeven make you smile. In these first weeks after delivery, try to take good care of yourself. It may take 4 to 6 weeks to feel like yourself again, and possibly longer if you had a  birth. You will likely feel very tired for several weeks. Your dayswill be full of ups and downs, but lots of deanna as well. Follow-up care is a key part of your treatment and safety. Be sure to make and go to all appointments, and call your doctor if you are having problems. It's also a good idea to know your test results and keep alist of the medicines you take. How can you care for yourself at home? Take care of your body after delivery  Use pads instead of tampons for the bloody flow that may last as long as 2 weeks. Ease cramps with ibuprofen (Advil, Motrin). Ease soreness of hemorrhoids and the area between your vagina and rectum with ice compresses or witch hazel pads. Ease constipation by drinking lots of fluid and eating high-fiber foods. Ask your doctor about over-the-counter stool softeners. Cleanse yourself with a gentle squeeze of warm water from a bottle instead of wiping with toilet paper. Take a sitz bath in warm water several times a day. Wear a good nursing bra. Ease sore and swollen breasts with warm, wet washcloths. If you aren't breastfeeding, use ice rather than heat for breast soreness.   Your period may not start for several months if you are breastfeeding. You may bleed more, and longer at first, than you did before you got pregnant. Wait until you are healed (about 4 to 6 weeks) before you have sex. Ask your doctor when it is okay for you to have sex. Try not to travel with your baby for 5 or 6 weeks. If you take a long car trip, make frequent stops to walk around and stretch. Avoid exhaustion  Rest every day. Try to nap when your baby naps. Ask another adult to be with you for a few days after delivery. Plan for  if you have other children. Stay flexible so you can eat at odd hours and sleep when you need to. Both you and your baby are making new schedules. Plan small trips to get out of the house. Change can make you feel less tired. Ask for help with housework, cooking, and shopping. Remind yourself that your job is to care for your baby. Know about help for postpartum depression  \"Baby blues\" are common for the first 1 to 2 weeks after birth. You may cry or feel sad or irritable for no reason. Rest whenever you can. Being tired makes it harder to handle your emotions. Go for walks with your baby. Talk to your partner, friends, and family about your feelings. If your symptoms last for more than a few weeks, or if you feel very depressed, ask your doctor for help. Postpartum depression can be treated. Support groups and counseling can help. Sometimes medicine can also help. Stay healthy  Eat healthy foods so you have more energy. If you breastfeed, avoid drugs. If you quit smoking during pregnancy, try to stay smoke-free. If you choose to have a drink now and then, have only one drink, and limit the number of occasions that you have a drink. Wait to breastfeed at least 2 hours after you have a drink to reduce the amount of alcohol the baby may get in the milk. Start daily exercise after 4 to 6 weeks, but rest when you feel tired. Learn exercises to tone your belly.  Do Kegel exercises to regain strength in your pelvic muscles. You can do these exercises while you stand or sit. Squeeze the same muscles you would use to stop your urine. Your belly and thighs should not move. Hold the squeeze for 3 seconds, and then relax for 3 seconds. Start with 3 seconds. Then add 1 second each week until you are able to squeeze for 10 seconds. Repeat the exercise 10 to 15 times for each session. Do three or more sessions each day. Find a class for you and your baby that has an exercise time. If you had a  birth, give yourself a bit more time before you exercise, and be careful. When should you call for help? Share this information with your partner, family, or a friend. They can help you watch for warning signs. Call 911 anytime you think you may need emergency care. For example, call if:    You have thoughts of harming yourself, your baby, or another person. You passed out (lost consciousness). You have chest pain, are short of breath, or cough up blood. You have a seizure. Call your doctor now or seek immediate medical care if:    You have signs of hemorrhage (too much bleeding), such as:  Heavy vaginal bleeding. This means that you are soaking through one or more pads in an hour. Or you pass blood clots bigger than an egg. Feeling dizzy or lightheaded, or you feel like you may faint. Feeling so tired or weak that you cannot do your usual activities. A fast or irregular heartbeat. New or worse belly pain. You have signs of infection, such as:  A fever. Vaginal discharge that smells bad. New or worse belly pain. You have symptoms of a blood clot in your leg (called a deep vein thrombosis), such as:  Pain in the calf, back of the knee, thigh, or groin. Redness and swelling in your leg or groin. You have signs of preeclampsia, such as:  Sudden swelling of your face, hands, or feet. New vision problems (such as dimness, blurring, or seeing spots). A severe headache.    Watch closely for changes in your health, and be sure to contact your doctor if:    Your vaginal bleeding isn't decreasing. You feel sad, anxious, or hopeless for more than a few days. You are having problems with your breasts or breastfeeding. Where can you learn more? Go to https://catia.healthLearn It Live. org and sign in to your Tr[employer] account. Enter S652 in the Stock[employer] box to learn more about \"After Your Delivery (the Postpartum Period): Care Instructions. \"     If you do not have an account, please click on the \"Sign Up Now\" link. Current as of: February 23, 2022               Content Version: 13.3  © 2006-2022 [employer]. Care instructions adapted under license by [facility] ([facility]). If you have questions about a medical condition or this instruction, always ask your healthcare professional. [place] [address] any warranty or liability for your use of this information. Learning About Preeclampsia After Childbirth  What is preeclampsia? Preeclampsia means that your blood pressure during pregnancy is higher thanusual. You may also have other serious symptoms. Preeclampsia can be dangerous. When it is severe, it can cause seizures (eclampsia) or liver or kidney damage. When it affects the liver, it can cause HELLP syndrome, a blood-clotting and bleeding problem. HELLP can come on quickly and can be deadly. This is why your doctor checks you and your babyoften. Preeclampsia usually occurs after 20 weeks of pregnancy. Most often, it starts near the end of pregnancy and goes away after childbirth. But symptoms may last a few weeks or more and can get worse after delivery. Rarely, symptoms ofpreeclampsia don't show up until days or even weeks after childbirth. What are the symptoms? Mild preeclampsia usually doesn't cause symptoms. But preeclampsia can causerapid weight gain and sudden swelling of the hands and face. Severe preeclampsia does cause symptoms.  It can cause a very bad headache and trouble seeing and breathing. It also can cause belly pain. You may alsourinate less than usual.  What can you expect after you have had preeclampsia? In the hospital  After the baby and the placenta are delivered, preeclampsia usually starts toimprove. Most women get better in the first few days after childbirth. After having preeclampsia, you still have a risk of seizures for a day or more after childbirth. (Very rarely, seizures happen later on.) So your doctor may have you take magnesium sulfate for a day or more to prevent seizures. You mayalso take medicine to lower your blood pressure. When you go home  Your blood pressure will most likely return to normal a few days after delivery. Your doctor will want to check your blood pressure sometime in thefirst week after you leave the hospital.  Some women still have high blood pressure 6 weeks after childbirth. But mostreturn to normal levels over the long term. Take and record your blood pressure at home if your doctor tells you to. Ask your doctor to check your blood pressure monitor to be sure that it is accurate and that the cuff fits you. Also ask your doctor to watch you use it, to make sure that you are using it right. You should not eat, use tobacco products, or use medicine known to raise blood pressure (such as some nasal decongestant sprays) before you take your blood pressure. Avoid taking your blood pressure if you have just exercised. Also avoid taking it if you are nervous or upset. Rest at least 15 minutes before you take your blood pressure. Be safe with medicines. If you take medicine, take it exactly as prescribed. Call your doctor if you think you are having a problem with your medicine. Do not smoke. Quitting smoking will help improve your baby's growth and health. If you need help quitting, talk to your doctor about stop-smoking programs and medicines.  These can increase your chances of quitting for good.  Eat a balanced and healthy diet that has lots of fruits and vegetables. Long-term health  After you have had preeclampsia, you have a higher-than-average risk of heart disease, stroke, and kidney disease. This may be because the same things thatcause preeclampsia also cause heart and kidney disease. To protect your health, work with your doctor on living a heart-healthy lifestyle and getting the checkups you need. Your doctor may also want you tocheck your blood pressure at home. Follow-up care is a key part of your treatment and safety. Be sure to make and go to all appointments, and call your doctor if you are having problems. It's also a good idea to know your test results and keep alist of the medicines you take. When should you call for help? Share this information with your partner or a friend. They can help you watch for warning signs. Call 911 anytime you think you may need emergency care. For example, call if:    You passed out (lost consciousness). You have a seizure. Call your doctor now or seek immediate medical care if:    You have symptoms of preeclampsia, such as:  Sudden swelling of your face, hands, or feet. New vision problems (such as dimness, blurring, or seeing spots). A severe headache. Your blood pressure is very high, such as 160/110 or higher. Your blood pressure is higher than your doctor told you it should be, or it rises quickly. You have new nausea or vomiting. You have pain in your belly or pelvis. Watch closely for changes in your health, and be sure to contact your doctor if:    You gain weight rapidly. Where can you learn more? Go to https://catia.[employer] [place] [email or web address]. org and sign in to your Pa[employer] account. Enter Q607 in the [facility] box to learn more about \"Learning About Preeclampsia After Childbirth. \"     If you do not have an account, please click on the \"Sign Up Now\" link.   Current as of: February 23, 2022 Content Version: 13.3  © 8938-2125 Health[employer]. Care instructions adapted under license by [facility] ([facility]). If you have questions about a medical condition or this instruction, always ask your healthcare professional. [address] any warranty or liability for your use of this information.

## 2022-08-31 NOTE — PROGRESS NOTES
This RN notified by CNA that pt /103. Upon entering room pt sitting on bed, manual /90. Dr. Nita Shay at nurses station and notified of the above. Per MD pt to have Lasix 20mg PO now and an additional Labetalol  100mg PO now. Pt to still be d/c and will change Labetalol to 200mg TID, MD to send prescription to pharmacy. Orders read back and verified. Orders placed. Call placed to [address] [facility] at [phone] spoke with Mona and verified that pt will need the Labetalol 200mg TID not the Labetalol 200 BID that Dr. Foreign Carrington sent.

## 2022-08-31 NOTE — LACTATION NOTE
This note was copied from a baby's chart. In to see mom and infant for discharge. Mom is doing both breast and bottle feeding. She plans to do more pumping when gets home as well. Reviewed importance of supply and demand in building milk supply. Encouraged 8-12 full feeds per day. Monitor output. Reviewed discharge info and how to manage period of engorgement w/ mom. She declined any further needs from lactation today.

## 2022-09-02 ENCOUNTER — POSTPARTUM VISIT (OUTPATIENT)
Dept: OBGYN CLINIC | Age: 32
End: 2022-09-02
Payer: MEDICAID

## 2022-09-02 VITALS
HEIGHT: 63 IN | BODY MASS INDEX: 29.23 KG/M2 | DIASTOLIC BLOOD PRESSURE: 92 MMHG | WEIGHT: 165 LBS | SYSTOLIC BLOOD PRESSURE: 142 MMHG

## 2022-09-02 NOTE — PROGRESS NOTES
female 4 days s/p vag delivery. No complaints. Hasn't had intercourse since delivery. Pregnancy and delivery were uncomplicated. Patient feels comfortable with caring for the baby. Pt has been checking BP at home and states that when she has been checking it has been [place] little high\" 150/100 last night pt reports she was in pain at the time. Has taken labetalol last at 3 am, due for dose at 11 am. Breastfeeding yes. Plans undecided for birth control. She feels her edema is much improved especially over the last couple days. She overall feels very well. She feels her blood pressure was a little bit higher last night due to discomfort from nursing and feels better today. Last pap: 22 wnl, hpv neg  History of GDM yes    BP (!) 142/92 (Site: Right Upper Arm, Position: Sitting)   Ht 5' 3\" (1.6 m)   Wt 165 lb (74.8 kg)   LMP 2021   Breastfeeding Yes   BMI 29.23 kg/m²   Affect appropriate and bright. She has minimal to no lower extremity edema. Encounter Diagnosis   Name Primary? Routine postpartum follow-up Yes       No orders of the defined types were placed in this encounter.       Shameka Kent MD, MD

## 2022-09-12 ENCOUNTER — POSTPARTUM VISIT (OUTPATIENT)
Dept: OBGYN CLINIC | Age: 32
End: 2022-09-12

## 2022-09-12 VITALS
HEIGHT: 63 IN | SYSTOLIC BLOOD PRESSURE: 130 MMHG | WEIGHT: 155 LBS | BODY MASS INDEX: 27.46 KG/M2 | DIASTOLIC BLOOD PRESSURE: 90 MMHG

## 2022-09-12 PROCEDURE — 99902 PR PRENATAL VISIT: CPT | Performed by: OBSTETRICS & GYNECOLOGY

## 2022-09-12 RX ORDER — ACETAMINOPHEN AND CODEINE PHOSPHATE 120; 12 MG/5ML; MG/5ML
1 SOLUTION ORAL DAILY
Qty: 28 TABLET | Refills: 12 | Status: SHIPPED | OUTPATIENT
Start: 2022-09-12

## 2022-09-12 NOTE — PROGRESS NOTES
[id number] female 2 weeks s/p vag delivery. No complaints. Hasn't had intercourse since delivery. Pregnancy and delivery were uncomplicated. Patient feels comfortable with caring for the baby. Breastfeeding yes. Plans undecided for birth control. She has been checking BP at home and reports that she takes labetalol PRN for blood pressure, not taking TID. She states that her BP at home has been improved. Last pap: 2/23/22 wnl, hpv neg  History of GDM yes    BP (!) 130/90 (Site: Right Upper Arm, Position: Sitting)   Ht 5' 3\" (1.6 m)   Wt 155 lb (70.3 kg)   LMP 11/05/2021   Breastfeeding Yes   BMI 27.46 kg/m²   Affect appropriate and bright  Abdomen soft and nontender. No masses noted. Normal externalia genitalia. Uterus and adnexae nontender and normal size. She is checking her Bps at home and they are close to normal; now on labetalol 200mg BID or once daily. Advised to cur back to 100 BID soon and may be totally off meds when she returns for her 6 week check.  For now, he is getting a VAS, start Micronor next Sunday ; Rxsent  Precautions  No PP depression

## 2022-10-10 ENCOUNTER — POSTPARTUM VISIT (OUTPATIENT)
Dept: OBGYN CLINIC | Age: 32
End: 2022-10-10
Payer: MEDICAID

## 2022-10-10 VITALS
BODY MASS INDEX: 27.14 KG/M2 | WEIGHT: 153.2 LBS | DIASTOLIC BLOOD PRESSURE: 62 MMHG | SYSTOLIC BLOOD PRESSURE: 110 MMHG | HEIGHT: 63 IN

## 2022-10-10 DIAGNOSIS — Z13.89 SCREENING FOR GENITOURINARY CONDITION: ICD-10-CM

## 2022-10-10 LAB
BILIRUBIN, URINE, POC: NEGATIVE
BLOOD URINE, POC: NORMAL
GLUCOSE URINE, POC: NEGATIVE
KETONES, URINE, POC: NEGATIVE
LEUKOCYTE ESTERASE, URINE, POC: NORMAL
NITRITE, URINE, POC: NEGATIVE
PH, URINE, POC: 6 (ref 4.6–8)
PROTEIN,URINE, POC: NEGATIVE
SPECIFIC GRAVITY, URINE, POC: 1.02 (ref 1–1.03)
URINALYSIS CLARITY, POC: CLEAR
URINALYSIS COLOR, POC: NORMAL
UROBILINOGEN, POC: NORMAL

## 2022-10-10 PROCEDURE — 99213 OFFICE O/P EST LOW 20 MIN: CPT | Performed by: OBSTETRICS & GYNECOLOGY

## 2022-10-10 PROCEDURE — 81002 URINALYSIS NONAUTO W/O SCOPE: CPT | Performed by: OBSTETRICS & GYNECOLOGY

## 2022-10-10 ASSESSMENT — PATIENT HEALTH QUESTIONNAIRE - PHQ9
SUM OF ALL RESPONSES TO PHQ QUESTIONS 1-9: 0
SUM OF ALL RESPONSES TO PHQ9 QUESTIONS 1 & 2: 0
SUM OF ALL RESPONSES TO PHQ QUESTIONS 1-9: 0
1. LITTLE INTEREST OR PLEASURE IN DOING THINGS: 0
SUM OF ALL RESPONSES TO PHQ QUESTIONS 1-9: 0
2. FEELING DOWN, DEPRESSED OR HOPELESS: 0
SUM OF ALL RESPONSES TO PHQ QUESTIONS 1-9: 0

## 2022-10-10 NOTE — PROGRESS NOTES
6 Week Postpartum Visit    Name: Alexsandra Rashid    Date: 10/10/2022    Age: 29 y.o.    OB History          4    Para   3    Term   3            AB   1    Living   3         SAB        IAB        Ectopic        Molar        Multiple   0    Live Births   3              Ht 5' 3\" (1.6 m)   LMP 2021        Delivered by Dr. Michael Thomas on 22 by KODAK. Infant's Name: Luis Enrique Nye  Infant's Gender: female  Birth Weight: 7lb 4 oz Feeding: breast milk feed exclusively   Desired Contraception: Micronor  Last Pap smear date: 22   Last Pap smear result: WNL HPV neg    Current Outpatient Medications   Medication Sig Dispense Refill    norethindrone (ORTHO MICRONOR) 0.35 MG tablet Take 1 tablet by mouth daily 28 tablet 12    labetalol (NORMODYNE) 200 MG tablet Take 1 tablet by mouth in the morning, at noon, and at bedtime (Patient taking differently: Take 200 mg by mouth in the morning, at noon, and at bedtime Pt taking PRN with blood pressure readings at home) 90 tablet 1    Prenat w/o [id number] (PNV-DHA PO) Take by mouth       No current facility-administered medications for this visit. We will stop the labetalol completely with normal BP, cont on the Micronor until vasectomy verified. No PP depression/blues  Precautions given, stay on PNVs        Urine clear ( blood from delivery)  Physical Exam   BUSEG wnl, vagina/cervix/uterus normal as well.  No pap due